# Patient Record
Sex: FEMALE | NOT HISPANIC OR LATINO | Employment: PART TIME | URBAN - METROPOLITAN AREA
[De-identification: names, ages, dates, MRNs, and addresses within clinical notes are randomized per-mention and may not be internally consistent; named-entity substitution may affect disease eponyms.]

---

## 2017-07-12 ENCOUNTER — TRANSFERRED RECORDS (OUTPATIENT)
Dept: HEALTH INFORMATION MANAGEMENT | Facility: CLINIC | Age: 22
End: 2017-07-12

## 2018-02-12 ENCOUNTER — OFFICE VISIT (OUTPATIENT)
Dept: FAMILY MEDICINE | Facility: CLINIC | Age: 23
End: 2018-02-12
Payer: COMMERCIAL

## 2018-02-12 VITALS
TEMPERATURE: 98.5 F | BODY MASS INDEX: 18.48 KG/M2 | HEART RATE: 80 BPM | SYSTOLIC BLOOD PRESSURE: 98 MMHG | HEIGHT: 66 IN | DIASTOLIC BLOOD PRESSURE: 66 MMHG | WEIGHT: 115 LBS

## 2018-02-12 DIAGNOSIS — K21.9 GASTROESOPHAGEAL REFLUX DISEASE WITHOUT ESOPHAGITIS: ICD-10-CM

## 2018-02-12 DIAGNOSIS — Z00.00 ROUTINE GENERAL MEDICAL EXAMINATION AT A HEALTH CARE FACILITY: Primary | ICD-10-CM

## 2018-02-12 DIAGNOSIS — F41.9 ANXIETY: ICD-10-CM

## 2018-02-12 DIAGNOSIS — Z23 NEED FOR HPV VACCINE: ICD-10-CM

## 2018-02-12 DIAGNOSIS — Z11.3 SCREENING EXAMINATION FOR VENEREAL DISEASE: ICD-10-CM

## 2018-02-12 PROCEDURE — 90471 IMMUNIZATION ADMIN: CPT | Performed by: PHYSICIAN ASSISTANT

## 2018-02-12 PROCEDURE — 90651 9VHPV VACCINE 2/3 DOSE IM: CPT | Performed by: PHYSICIAN ASSISTANT

## 2018-02-12 PROCEDURE — 87591 N.GONORRHOEAE DNA AMP PROB: CPT | Performed by: PHYSICIAN ASSISTANT

## 2018-02-12 PROCEDURE — 87491 CHLMYD TRACH DNA AMP PROBE: CPT | Performed by: PHYSICIAN ASSISTANT

## 2018-02-12 PROCEDURE — G0145 SCR C/V CYTO,THINLAYER,RESCR: HCPCS | Performed by: PHYSICIAN ASSISTANT

## 2018-02-12 PROCEDURE — 99385 PREV VISIT NEW AGE 18-39: CPT | Mod: 25 | Performed by: PHYSICIAN ASSISTANT

## 2018-02-12 RX ORDER — SERTRALINE HYDROCHLORIDE 100 MG/1
100 TABLET, FILM COATED ORAL DAILY
Qty: 90 TABLET | Refills: 1 | Status: SHIPPED | OUTPATIENT
Start: 2018-02-12 | End: 2018-08-29

## 2018-02-12 RX ORDER — SERTRALINE HYDROCHLORIDE 100 MG/1
100 TABLET, FILM COATED ORAL DAILY
COMMUNITY
End: 2018-02-12

## 2018-02-12 ASSESSMENT — ENCOUNTER SYMPTOMS
NEUROLOGICAL NEGATIVE: 1
ROS SKIN COMMENTS: ALOPECIA
RESPIRATORY NEGATIVE: 1
ARTHRALGIAS: 1
NERVOUS/ANXIOUS: 1
CONSTITUTIONAL NEGATIVE: 1
ABDOMINAL PAIN: 1
HEARTBURN: 1
EYES NEGATIVE: 1
CARDIOVASCULAR NEGATIVE: 1

## 2018-02-12 ASSESSMENT — ANXIETY QUESTIONNAIRES
3. WORRYING TOO MUCH ABOUT DIFFERENT THINGS: SEVERAL DAYS
1. FEELING NERVOUS, ANXIOUS, OR ON EDGE: MORE THAN HALF THE DAYS
IF YOU CHECKED OFF ANY PROBLEMS ON THIS QUESTIONNAIRE, HOW DIFFICULT HAVE THESE PROBLEMS MADE IT FOR YOU TO DO YOUR WORK, TAKE CARE OF THINGS AT HOME, OR GET ALONG WITH OTHER PEOPLE: SOMEWHAT DIFFICULT
7. FEELING AFRAID AS IF SOMETHING AWFUL MIGHT HAPPEN: MORE THAN HALF THE DAYS
2. NOT BEING ABLE TO STOP OR CONTROL WORRYING: MORE THAN HALF THE DAYS
5. BEING SO RESTLESS THAT IT IS HARD TO SIT STILL: NOT AT ALL
GAD7 TOTAL SCORE: 7
6. BECOMING EASILY ANNOYED OR IRRITABLE: NOT AT ALL

## 2018-02-12 ASSESSMENT — PATIENT HEALTH QUESTIONNAIRE - PHQ9: 5. POOR APPETITE OR OVEREATING: NOT AT ALL

## 2018-02-12 NOTE — PROGRESS NOTES
SUBJECTIVE:   CC: Shirley Matthew is an 22 year old woman who presents for preventive health visit.     Physical   Annual:     Getting at least 3 servings of Calcium per day::  NO    Bi-annual eye exam::  NO    Dental care twice a year::  NO    Sleep apnea or symptoms of sleep apnea::  None    Diet::  Regular (no restrictions)    Frequency of exercise::  1 day/week    Duration of exercise::  Less than 15 minutes    Taking medications regularly::  Yes    Medication side effects::  None    Additional concerns today::  YES            ACID reflux discussion wakes up with throat burning and ears burning.  Feels gas in throat.  Happening more lately.  Gaviscon helps if she takes before acid reflux.  Started a few months ago.  No vomiting or weight loss.  Can have lingering throat pain.  No more stress.      Today's PHQ-2 Score:   PHQ-2 ( 1999 Pfizer) 2/12/2018   Q1: Little interest or pleasure in doing things 0   Q2: Feeling down, depressed or hopeless 0   PHQ-2 Score 0   Q1: Little interest or pleasure in doing things Not at all   Q2: Feeling down, depressed or hopeless Not at all   PHQ-2 Score 0       Abuse: Current or Past(Physical, Sexual or Emotional)- no   Do you feel safe in your environment - Yes    Social History   Substance Use Topics     Smoking status: Never Smoker     Smokeless tobacco: Never Used     Alcohol use No     Alcohol Use 2/12/2018   If you drink alcohol, do you typically have greater than 3 drinks per day OR greater than 7 drinks per week?   Not applicable       Reviewed orders with patient.  Reviewed health maintenance and updated orders accordingly - Yes      Mammogram not appropriate for this patient based on age.    Pertinent mammograms are reviewed under the imaging tab.  History of abnormal Pap smear: NO - age 21-29 PAP every 3 years recommended    Reviewed and updated as needed this visit by clinical staff  Allergies  Meds  Problems  Med Hx  Fam Hx         Reviewed and updated as  "needed this visit by Provider  Allergies  Meds  Problems  Med Hx  Fam Hx            Review of Systems   Constitutional: Negative.    HENT: Negative.    Eyes: Negative.    Respiratory: Negative.    Cardiovascular: Negative.    Gastrointestinal: Positive for abdominal pain and heartburn.   Genitourinary: Negative.    Musculoskeletal: Positive for arthralgias (leg pain at night.  doesn't feel like she has to move legs, since childhood ).   Skin: Negative.         alopecia    Neurological: Negative.    Psychiatric/Behavioral: The patient is nervous/anxious (is being treated ).           OBJECTIVE:   BP 98/66 (BP Location: Right arm, Patient Position: Sitting, Cuff Size: Adult Regular)  Pulse 80  Temp 98.5  F (36.9  C) (Oral)  Ht 5' 5.5\" (1.664 m)  Wt 115 lb (52.2 kg)  LMP 02/05/2018  BMI 18.85 kg/m2  Physical Exam   Constitutional: She is oriented to person, place, and time. She appears well-developed and well-nourished. No distress.   HENT:   Right Ear: External ear normal.   Left Ear: External ear normal.   Nose: Nose normal.   Mouth/Throat: Oropharynx is clear and moist. No oropharyngeal exudate.   Eyes: Conjunctivae are normal. Pupils are equal, round, and reactive to light. Right eye exhibits no discharge. Left eye exhibits no discharge.   Neck: Neck supple. No tracheal deviation present. No thyromegaly present.   Cardiovascular: Normal rate, regular rhythm, S1 normal, S2 normal, normal heart sounds and normal pulses.  Exam reveals no S3, no S4 and no friction rub.    No murmur heard.  Pulmonary/Chest: Effort normal and breath sounds normal. No respiratory distress. She has no wheezes. She has no rales.   Abdominal: Soft. Bowel sounds are normal. She exhibits no mass. There is no hepatosplenomegaly. There is tenderness (epigastric ).   Genitourinary: Vagina normal and uterus normal. No breast swelling, tenderness or discharge. Cervix exhibits no motion tenderness and no discharge. No vaginal discharge " "found.   Musculoskeletal: Normal range of motion. She exhibits no edema.   Lymphadenopathy:     She has no cervical adenopathy.   Neurological: She is alert and oriented to person, place, and time. She has normal strength and normal reflexes. She exhibits normal muscle tone.   Skin: Skin is warm and dry. No rash noted.   Psychiatric: She has a normal mood and affect. Judgment and thought content normal. Cognition and memory are normal.         ASSESSMENT/PLAN:   1. Routine general medical examination at a health care facility    - Pap imaged thin layer screen only - recommended age 21 - 24 years    2. Gastroesophageal reflux disease without esophagitis  Try zantac, follow up if not improving   - ranitidine (ZANTAC) 150 MG tablet; Take 1 tablet (150 mg) by mouth At Bedtime  Dispense: 60 tablet; Refill: 1    3. Anxiety  Continue.  Follow up every 6 months or as needed  - sertraline (ZOLOFT) 100 MG tablet; Take 1 tablet (100 mg) by mouth daily  Dispense: 90 tablet; Refill: 1    4. Screening examination for venereal disease    - NEISSERIA GONORRHOEA PCR  - CHLAMYDIA TRACHOMATIS PCR    5. Need for HPV vaccine    - HC HPV VAC 9V 3 DOSE IM    COUNSELING:  Reviewed preventive health counseling, as reflected in patient instructions       Regular exercise       Healthy diet/nutrition       Contraception       Folic Acid Counseling         reports that she has never smoked. She has never used smokeless tobacco.    Estimated body mass index is 18.85 kg/(m^2) as calculated from the following:    Height as of this encounter: 5' 5.5\" (1.664 m).    Weight as of this encounter: 115 lb (52.2 kg).       Counseling Resources:  ATP IV Guidelines  Pooled Cohorts Equation Calculator  Breast Cancer Risk Calculator  FRAX Risk Assessment  ICSI Preventive Guidelines  Dietary Guidelines for Americans, 2010  [employer]'s MyPlate  ASA Prophylaxis  Lung CA Screening    Jumana Mcbride PA-C  [facility]  Answers for HPI/ROS " submitted by the patient on 2/12/2018   PHQ-2 Score: 0

## 2018-02-12 NOTE — MR AVS SNAPSHOT
"              After Visit Summary   2/12/2018    Shirley Matthew    MRN: [id number]           Patient Information     Date Of Birth          1995        Visit Information        Provider Department      2/12/2018 2:00 PM Jumana Mcbride PA-C [facility]        Today's Diagnoses     Routine general medical examination at a health care facility    -  1    Screening examination for venereal disease        Need for HPV vaccine        Anxiety        Gastroesophageal reflux disease without esophagitis          Care Instructions    Start a prenatal vitamin           Follow-ups after your visit        Follow-up notes from your care team     Return in about 6 months (around 8/12/2018) for mood.      Who to contact     If you have questions or need follow up information about today's clinic visit or your schedule please contact [facility] directly at [phone].  Normal or non-critical lab and imaging results will be communicated to you by MyChart, letter or phone within 4 business days after the clinic has received the results. If you do not hear from us within 7 days, please contact the clinic through MyChart or phone. If you have a critical or abnormal lab result, we will notify you by phone as soon as possible.  Submit refill requests through Labfolder or call your pharmacy and they will forward the refill request to us. Please allow 3 business days for your refill to be completed.          Additional Information About Your Visit        MyChart Information     Labfolder lets you send messages to your doctor, view your test results, renew your prescriptions, schedule appointments and more. To sign up, go to [email or web address] . Click on \"Log in\" on the left side of the screen, which will take you to the Welcome page. Then click on \"Sign up Now\" on the right side of the page.     You will be asked to enter the access code listed below, as well as some " "personal information. Please follow the directions to create your username and password.     Your access code is: [email or web address]  Expires: 2018  3:21 PM     Your access code will  in 90 days. If you need help or a new code, please call your [facility] clinic or [phone].        Care EveryWhere ID     This is your Care EveryWhere ID. This could be used by other organizations to access your [facility] medical records  [id number]        Your Vitals Were     Pulse Temperature Height Last Period BMI (Body Mass Index)       80 98.5  F (36.9  C) (Oral) 5' 5.5\" (1.664 m) 2018 18.85 kg/m2        Blood Pressure from Last 3 Encounters:   18 98/    Weight from Last 3 Encounters:   18 115 lb (52.2 kg)              We Performed the Following     CHLAMYDIA TRACHOMATIS PCR     HC HPV VAC 9V 3 DOSE IM     NEISSERIA GONORRHOEA PCR     Pap imaged thin layer screen only - recommended age 21 - 24 years          Today's Medication Changes          These changes are accurate as of 18  3:21 PM.  If you have any questions, ask your nurse or doctor.               Start taking these medicines.        Dose/Directions    ranitidine 150 MG tablet   Commonly known as:  ZANTAC   Used for:  Gastroesophageal reflux disease without esophagitis   Started by:  Jumana Mcbride PA-C        Dose:  150 mg   Take 1 tablet (150 mg) by mouth At Bedtime   Quantity:  60 tablet   Refills:  1            Where to get your medicines      These medications were sent to [place] [address] [place] [facility] [place] [address]  [facility] [address] [place] [address]     Phone:  [phone]     ranitidine 150 MG tablet    sertraline 100 MG tablet                Primary Care Provider Office Phone # Fax #    [facility] [phone]       [address] [place] [address]        Equal Access to Services     JUANI REYNODLS AH: Hadii quita trevizo Soelkin, waaxda luqadakaye, qaybta kaalmada " daryl valenciamadelineaustin la'aajaswinder ah. [facility] [phone].    ATENCIÓN: Si habla español, tiene a tomas disposición servicios gratuitos de asistencia lingüística. Tuan al [phone].    We comply with applicable federal civil rights laws and [place] laws. We do not discriminate on the basis of race, color, national origin, age, disability, sex, sexual orientation, or gender identity.            Thank you!     Thank you for choosing [facility]  for your care. Our goal is always to provide you with excellent care. Hearing back from our patients is one way we can continue to improve our services. Please take a few minutes to complete the written survey that you may receive in the mail after your visit with us. Thank you!             Your Updated Medication List - Protect others around you: Learn how to safely use, store and throw away your medicines at www.disposemymeds.org.          This list is accurate as of 2/12/18  3:21 PM.  Always use your most recent med list.                   Brand Name Dispense Instructions for use Diagnosis    GAVISCON EXTRA STRENGTH PO           ranitidine 150 MG tablet    ZANTAC    60 tablet    Take 1 tablet (150 mg) by mouth At Bedtime    Gastroesophageal reflux disease without esophagitis       sertraline 100 MG tablet    ZOLOFT    90 tablet    Take 1 tablet (100 mg) by mouth daily    Anxiety

## 2018-02-12 NOTE — NURSING NOTE
"Chief Complaint   Patient presents with     Physical       Initial BP 98/66 (BP Location: Right arm, Patient Position: Sitting, Cuff Size: Adult Regular)  Pulse 80  Temp 98.5  F (36.9  C) (Oral)  Ht 5' 5.5\" (1.664 m)  Wt 115 lb (52.2 kg)  LMP 02/05/2018  BMI 18.85 kg/m2 Estimated body mass index is 18.85 kg/(m^2) as calculated from the following:    Height as of this encounter: 5' 5.5\" (1.664 m).    Weight as of this encounter: 115 lb (52.2 kg).  Medication Reconciliation: complete  Sunni Roth MA    "

## 2018-02-12 NOTE — NURSING NOTE
Patient is covered under this program for the following reason:        Screening Questionnaire for Adult Immunization   Are you sick today?   No    Do you have allergies to medications, food, a vaccine component or latex?   No    Have you ever had a serious reaction after receiving a vaccination?   No    Do you have a long-term health problem with heart disease, lung disease,  asthma, kidney disease, metabolic (e.g., diabetes), anemia, or other blood disorder?   No    Do you have cancer, leukemia,HIV/ AIDS, or any immune system problem?   No       In the past 3 months, have you taken medications that weaken your immune system, such as cortisone, prednisone, other steroids, or anticancer drugs, or have you had radiation treatments?   No    Have you had a seizure or a brain, or other nervous system problem?   No    During the past year, have you received a transfusion of blood or blood       products, or been given a  immune (gamma) globulin or an antiviral drug?   No    For women: Are you pregnant or is there a chance you could become         pregnant during the next month?   No    Have you received any vaccinations in the past 4 weeks?   No     Immunization questionnaire answers were all negative.     VIS for HPV  given on same date of administration.  Staff signature/Title: Sunni Roth MA  Due to injection administration, patient instructed to remain in clinic for 15 minutes  afterwards, and to report any adverse reaction to me immediately.

## 2018-02-12 NOTE — LETTER
February 15, 2018      Shirley Stearns Vipul  [address] [place] [address]    Dear ,      I am happy to inform you that your recent cervical cancer screening test (PAP smear) was normal.      Preventative screenings such as this help to ensure your health for years to come. You should repeat a pap smear in 3 years, unless otherwise directed.      You will still need to return to the clinic every year for your annual exam and other preventive tests.     Please contact the clinic at [phone] if you have further questions.       Sincerely,      Jumana Mcbride PA-C/cori

## 2018-02-12 NOTE — LETTER
[facility]  [address] [place]  [address]  [phone]        February 13, 2018    Shirley Matthew  [address]  [facility] [address]        Dear Shirley,    Your sexually transmitted disease testing is negative.     Results for orders placed or performed in visit on 02/12/18   NEISSERIA GONORRHOEA PCR   Result Value Ref Range    Specimen Descrip Cervix     N Gonorrhea PCR Negative NEG^Negative   CHLAMYDIA TRACHOMATIS PCR   Result Value Ref Range    Specimen Description Cervix     Chlamydia Trachomatis PCR Negative NEG^Negative       If you have any questions please call the clinic at [phone].    Sincerely,    Jumana MCDONALDL

## 2018-02-13 LAB
C TRACH DNA SPEC QL NAA+PROBE: NEGATIVE
N GONORRHOEA DNA SPEC QL NAA+PROBE: NEGATIVE
SPECIMEN SOURCE: NORMAL
SPECIMEN SOURCE: NORMAL

## 2018-02-13 ASSESSMENT — ANXIETY QUESTIONNAIRES: GAD7 TOTAL SCORE: 7

## 2018-02-13 ASSESSMENT — PATIENT HEALTH QUESTIONNAIRE - PHQ9: SUM OF ALL RESPONSES TO PHQ QUESTIONS 1-9: 1

## 2018-02-14 LAB
COPATH REPORT: NORMAL
PAP: NORMAL

## 2018-06-17 DIAGNOSIS — K21.9 GASTROESOPHAGEAL REFLUX DISEASE WITHOUT ESOPHAGITIS: ICD-10-CM

## 2018-06-18 NOTE — TELEPHONE ENCOUNTER
"Requested Prescriptions   Pending Prescriptions Disp Refills     ranitidine (ZANTAC) 150 MG tablet [Pharmacy Med Name: RANITIDINE 150 MG TABLET] 60 tablet 1        Last Written Prescription Date:  2/12/18  Last Fill Quantity: 60,  # refills: 1   Last office visit: 2/12/2018 with prescribing provider:  2/12/18   Future Office Visit:     Sig: TAKE 1 TABLET BY MOUTH NIGHTLY AT BEDTIME    H2 Blockers Protocol Passed    6/17/2018  1:28 AM       Passed - Patient is age 12 or older       Passed - Recent (12 mo) or future (30 days) visit within the authorizing provider's specialty    Patient had office visit in the last 12 months or has a visit in the next 30 days with authorizing provider or within the authorizing provider's specialty.  See \"Patient Info\" tab in inbasket, or \"Choose Columns\" in Meds & Orders section of the refill encounter.            See plan at 2/12/18 visit:            Instructions        Return in about 6 months (around 8/12/2018) for mood.       Prescription approved per [facility] Refill Protocol.    Reina Angulo RN  [facility]      "

## 2018-11-19 DIAGNOSIS — F41.9 ANXIETY: ICD-10-CM

## 2018-11-19 NOTE — TELEPHONE ENCOUNTER
Requested Prescriptions   Pending Prescriptions Disp Refills     sertraline (ZOLOFT) 100 MG tablet 30 tablet 0     Sig: Take 1 tablet (100 mg) by mouth daily    There is no refill protocol information for this order   Last Written Prescription Date:  8-30-18  Last Fill Quantity: 30,  # refills: 0   Last office visit: 2/12/2018 with prescribing provider:  2-12-18   Future Office Visit:

## 2018-11-20 NOTE — TELEPHONE ENCOUNTER
TC:  Please outreach to patient. She was asked to follow up in 6 months and was due to be seen in August.  Thank you.  Dnia Iraheta RN

## 2018-11-23 NOTE — TELEPHONE ENCOUNTER
"Requested Prescriptions   Pending Prescriptions Disp Refills     sertraline (ZOLOFT) 100 MG tablet 30 tablet 0     Sig: Take 1 tablet (100 mg) by mouth daily    SSRIs Protocol Passed    11/19/2018  3:05 PM       Passed - Recent (12 mo) or future (30 days) visit within the authorizing provider's specialty    Patient had office visit in the last 12 months or has a visit in the next 30 days with authorizing provider or within the authorizing provider's specialty.  See \"Patient Info\" tab in inbasket, or \"Choose Columns\" in Meds & Orders section of the refill encounter.             Passed - Patient is age 18 or older       Passed - No active pregnancy on record       Passed - No positive pregnancy test in last 12 months        PHQ-9 score:    PHQ-9 SCORE 2/12/2018   Total Score 1       Attempted to call patient at home number, left message with adult female who answered; she took message: patient was instructed to return call to [facility] RN directly on the RN call back line at [phone]   Reina Angulo RN  [facility] [place] [facility]          "

## 2018-11-26 RX ORDER — SERTRALINE HYDROCHLORIDE 100 MG/1
100 TABLET, FILM COATED ORAL DAILY
Qty: 15 TABLET | Refills: 0 | Status: SHIPPED | OUTPATIENT
Start: 2018-11-26 | End: 2018-11-28

## 2018-11-26 NOTE — TELEPHONE ENCOUNTER
Patient is scheduled for 11/28/18.    Last Rx was only 30 days on 8/30/18.    I called [facility] who confirms patient last filled 30 days on 9/15/18.    Would have been off a while now.      Routing refill request to provider for review/approval because:  Provider to advise on refill or just wait until patient seen in 2 days.    Reina Angulo RN  [facility]

## 2018-11-28 ENCOUNTER — OFFICE VISIT (OUTPATIENT)
Dept: FAMILY MEDICINE | Facility: CLINIC | Age: 23
End: 2018-11-28
Payer: COMMERCIAL

## 2018-11-28 VITALS
HEART RATE: 80 BPM | TEMPERATURE: 98.3 F | BODY MASS INDEX: 19.67 KG/M2 | DIASTOLIC BLOOD PRESSURE: 60 MMHG | WEIGHT: 120 LBS | SYSTOLIC BLOOD PRESSURE: 103 MMHG

## 2018-11-28 DIAGNOSIS — Z23 NEED FOR HPV VACCINE: ICD-10-CM

## 2018-11-28 DIAGNOSIS — F41.9 ANXIETY: Primary | ICD-10-CM

## 2018-11-28 DIAGNOSIS — Z23 NEED FOR PROPHYLACTIC VACCINATION WITH TETANUS-DIPHTHERIA (TD): ICD-10-CM

## 2018-11-28 DIAGNOSIS — M25.531 RIGHT WRIST PAIN: ICD-10-CM

## 2018-11-28 PROCEDURE — 99213 OFFICE O/P EST LOW 20 MIN: CPT | Mod: 25 | Performed by: PHYSICIAN ASSISTANT

## 2018-11-28 PROCEDURE — 90649 4VHPV VACCINE 3 DOSE IM: CPT | Performed by: PHYSICIAN ASSISTANT

## 2018-11-28 PROCEDURE — 90471 IMMUNIZATION ADMIN: CPT | Performed by: PHYSICIAN ASSISTANT

## 2018-11-28 PROCEDURE — 90472 IMMUNIZATION ADMIN EACH ADD: CPT | Performed by: PHYSICIAN ASSISTANT

## 2018-11-28 PROCEDURE — 90714 TD VACC NO PRESV 7 YRS+ IM: CPT | Performed by: PHYSICIAN ASSISTANT

## 2018-11-28 RX ORDER — SERTRALINE HYDROCHLORIDE 100 MG/1
100 TABLET, FILM COATED ORAL DAILY
Qty: 90 TABLET | Refills: 1 | Status: SHIPPED | OUTPATIENT
Start: 2018-11-28 | End: 2019-05-31

## 2018-11-28 ASSESSMENT — PATIENT HEALTH QUESTIONNAIRE - PHQ9: 5. POOR APPETITE OR OVEREATING: NOT AT ALL

## 2018-11-28 ASSESSMENT — ANXIETY QUESTIONNAIRES
GAD7 TOTAL SCORE: 2
1. FEELING NERVOUS, ANXIOUS, OR ON EDGE: SEVERAL DAYS
2. NOT BEING ABLE TO STOP OR CONTROL WORRYING: NOT AT ALL
6. BECOMING EASILY ANNOYED OR IRRITABLE: SEVERAL DAYS
3. WORRYING TOO MUCH ABOUT DIFFERENT THINGS: NOT AT ALL
7. FEELING AFRAID AS IF SOMETHING AWFUL MIGHT HAPPEN: NOT AT ALL
5. BEING SO RESTLESS THAT IT IS HARD TO SIT STILL: NOT AT ALL
IF YOU CHECKED OFF ANY PROBLEMS ON THIS QUESTIONNAIRE, HOW DIFFICULT HAVE THESE PROBLEMS MADE IT FOR YOU TO DO YOUR WORK, TAKE CARE OF THINGS AT HOME, OR GET ALONG WITH OTHER PEOPLE: NOT DIFFICULT AT ALL

## 2018-11-28 NOTE — PROGRESS NOTES
SUBJECTIVE:   Shirley Matthew is a 22 year old female who presents to clinic today for the following health issues:        Anxiety Follow-Up    Status since last visit: Improved     Other associated symptoms:None    Complicating factors:   Significant life event: No   Current substance abuse: None  Depression symptoms: No  JUAN DIEGO-7 SCORE 2/12/2018   Total Score 7       JUAN DIEGO-7    Amount of exercise or physical activity: None    Problems taking medications regularly: No    Medication side effects: none    Diet: regular (no restrictions)    Not seeing a therapist.  Wants to stay on them.      Every few months right wrist hurts.  Is a teller and right handed.  Comes and goes.  No injury.  No tingling in hands.  Pain is over the top of her wrist        Problem list and histories reviewed & adjusted, as indicated.  Additional history: as documented    Patient Active Problem List   Diagnosis     Anxiety     Alopecia     Gastroesophageal reflux disease without esophagitis     History reviewed. No pertinent surgical history.    Social History   Substance Use Topics     Smoking status: Never Smoker     Smokeless tobacco: Never Used     Alcohol use No     Family History   Problem Relation Age of Onset     Colon Cancer Father 52     Alcoholism Father      Anxiety Disorder Sister      Anxiety Disorder Brother      Alcoholism Brother      Thyroid Disease Maternal Grandmother      Thyroid Disease Paternal Grandmother      Anxiety Disorder Sister      Alcoholism Mother      Suicide Cousin      Schizophrenia Cousin            Reviewed and updated as needed this visit by clinical staff  Tobacco  Allergies  Meds  Med Hx  Surg Hx  Fam Hx  Soc Hx      Reviewed and updated as needed this visit by Provider         ROS:  As above    OBJECTIVE:     /60  Pulse 80  Temp 98.3  F (36.8  C) (Oral)  Wt 120 lb (54.4 kg)  LMP 11/07/2018  BMI 19.67 kg/m2  Body mass index is 19.67 kg/(m^2).  GENERAL: healthy, alert and no  distress  RESP: lungs clear to auscultation - no rales, rhonchi or wheezes  CV: regular rates and rhythm, normal S1 S2, no S3 or S4 and no murmur, click or rub  MS: normal rom of both wrists, non tender to palpation both writs.    PSYCH: mentation appears normal, affect normal/bright    Diagnostic Test Results:  none     ASSESSMENT/PLAN:       1. Anxiety  Stable.  Really well controlled.  No changes  - sertraline (ZOLOFT) 100 MG tablet; Take 1 tablet (100 mg) by mouth daily  Dispense: 90 tablet; Refill: 1    2. Right wrist pain  Overuse injury.  recommended wrist brace as needed.      3. Need for HPV vaccine    - HUMAN PAPILLOMAVIRUS VACCINE  - VACCINE ADMINISTRATION, INITIAL  - VACCINE ADMINISTRATION, EACH ADDITIONAL    4. Need for prophylactic vaccination with tetanus-diphtheria (Td)    - TD (ADULT, 7+) PRESERVE FREE    FUTURE APPOINTMENTS:       - Follow-up visit in 6 months    Jumana Mcbride PA-C  [facility]

## 2018-11-28 NOTE — NURSING NOTE
Screening Questionnaire for Adult Immunization    Are you sick today?   No   Do you have allergies to medications, food, a vaccine component or latex?   No   Have you ever had a serious reaction after receiving a vaccination?   No   Do you have a long-term health problem with heart disease, lung disease, asthma, kidney disease, metabolic disease (e.g. diabetes), anemia, or other blood disorder?   No   Do you have cancer, leukemia, HIV/AIDS, or any other immune system problem?   No   In the past 3 months, have you taken medications that affect  your immune system, such as prednisone, other steroids, or anticancer drugs; drugs for the treatment of rheumatoid arthritis, Crohn s disease, or psoriasis; or have you had radiation treatments?   No   Have you had a seizure, or a brain or other nervous system problem?   No   During the past year, have you received a transfusion of blood or blood     products, or been given immune (gamma) globulin or antiviral drug?   No   For women: Are you pregnant or is there a chance you could become        pregnant during the next month?   No   Have you received any vaccinations in the past 4 weeks?   No     Immunization questionnaire answers were all negative.        Per orders of Dr. Mcbride, injection of HPV Td given by Sunni Roth. Patient instructed to remain in clinic for 15 minutes afterwards, and to report any adverse reaction to me immediately.       Screening performed by Sunni Roth on 11/28/2018 at 1:28 PM.

## 2018-11-28 NOTE — PATIENT INSTRUCTIONS
Billing/ Insurance    There will be a charge that will be billed to your insurance company. If your insurance does not cover it, it will be billed to you. It is charged based on the time spent on the telephone with you in increments of 10 minutes of medical discussion. You may wish to check with your insurance company to see if they cover telephone visits.    Cost of Phone Visits/ E-Visits    5 to 10 minutes is $30.00  11 to 20 minutes is $59.00  21 to 30 minutes is $85.00     E-Visit is $35.00    Before Visit    Before your visit with the provider, a staff person will call you to obtain a second verbal consent, verify your insurance company, home address, and phone number. We will also need to update your medication list and allergies.    In the end, if you wish to see the provider in the office instead, we can help you make your in person appointment. Through Robin, you can also send and E-Visit Request and the provider will respond to you within 24 hours.

## 2018-11-28 NOTE — MR AVS SNAPSHOT
After Visit Summary   11/28/2018    Shirley Matthew    MRN: [id number]           Patient Information     Date Of Birth          1995        Visit Information        Provider Department      11/28/2018 12:40 PM Jumana Mcbride PA-C [facility]        Today's Diagnoses     Anxiety    -  1    Need for HPV vaccine        Need for prophylactic vaccination with tetanus-diphtheria (Td)        Right wrist pain          Care Instructions    Billing/ Insurance    There will be a charge that will be billed to your insurance company. If your insurance does not cover it, it will be billed to you. It is charged based on the time spent on the telephone with you in increments of 10 minutes of medical discussion. You may wish to check with your insurance company to see if they cover telephone visits.    Cost of Phone Visits/ E-Visits    5 to 10 minutes is $30.00  11 to 20 minutes is $59.00  21 to 30 minutes is $85.00     E-Visit is $35.00    Before Visit    Before your visit with the provider, a staff person will call you to obtain a second verbal consent, verify your insurance company, home address, and phone number. We will also need to update your medication list and allergies.    In the end, if you wish to see the provider in the office instead, we can help you make your in person appointment. Through [employer], you can also send and E-Visit Request and the provider will respond to you within 24 hours.            Follow-ups after your visit        Follow-up notes from your care team     Return in about 6 months (around 5/28/2019) for mood.      Who to contact     If you have questions or need follow up information about today's clinic visit or your schedule please contact [facility] directly at [phone].  Normal or non-critical lab and imaging results will be communicated to you by MyChart, letter or phone within 4 business days after the clinic has  "received the results. If you do not hear from us within 7 days, please contact the clinic through [employer] or phone. If you have a critical or abnormal lab result, we will notify you by phone as soon as possible.  Submit refill requests through [employer] or call your pharmacy and they will forward the refill request to us. Please allow 3 business days for your refill to be completed.          Additional Information About Your Visit        [employer] Information     [employer] lets you send messages to your doctor, view your test results, renew your prescriptions, schedule appointments and more. To sign up, go to [email or web address] . Click on \"Log in\" on the left side of the screen, which will take you to the Welcome page. Then click on \"Sign up Now\" on the right side of the page.     You will be asked to enter the access code listed below, as well as some personal information. Please follow the directions to create your username and password.     Your access code is: [email or web address]  Expires: 2019  1:17 PM     Your access code will  in 90 days. If you need help or a new code, please call your [place] clinic or [phone].        Care EveryWhere ID     This is your Care EveryWhere ID. This could be used by other organizations to access your [place] medical records  [id number]        Your Vitals Were     Pulse Temperature Last Period BMI (Body Mass Index)          80 98.3  F (36.8  C) (Oral) 2018 19.67 kg/m2         Blood Pressure from Last 3 Encounters:   18 103/60   18 98/66    Weight from Last 3 Encounters:   18 120 lb (54.4 kg)   18 115 lb (52.2 kg)              We Performed the Following     HUMAN PAPILLOMAVIRUS VACCINE     TD (ADULT, 7+) PRESERVE FREE     VACCINE ADMINISTRATION, EACH ADDITIONAL     VACCINE ADMINISTRATION, INITIAL          Today's Medication Changes          These changes are accurate as of 18  1:17 PM.  If you have any questions, ask your nurse or " doctor.               These medicines have changed or have updated prescriptions.        Dose/Directions    sertraline 100 MG tablet   Commonly known as:  ZOLOFT   This may have changed:  additional instructions   Used for:  Anxiety   Changed by:  Jumana Mcbride PA-C        Dose:  100 mg   Take 1 tablet (100 mg) by mouth daily   Quantity:  90 tablet   Refills:  1            Where to get your medicines      These medications were sent to [place] [address] [facility] - [facility] [address]  [address], [facility] [address]     Phone:  [phone]     sertraline 100 MG tablet                Primary Care Provider Office Phone # Fax #    Jumana Mcbride PA-C [phone]       [address]  [facility] [address]        Equal Access to Services     JUANI REYNOLDS : Hadii quita bowden hadasho Soomaali, waaxda luqadaha, qaybta kaalmada adeegyada, daryl chamorro . So [facility] [phone].    ATENCIÓN: Si habla español, tiene a tomas disposición servicios gratuitos de asistencia lingüística. Llame al [phone].    We comply with applicable federal civil rights laws and [place] laws. We do not discriminate on the basis of race, color, national origin, age, disability, sex, sexual orientation, or gender identity.            Thank you!     Thank you for choosing [facility]  for your care. Our goal is always to provide you with excellent care. Hearing back from our patients is one way we can continue to improve our services. Please take a few minutes to complete the written survey that you may receive in the mail after your visit with us. Thank you!             Your Updated Medication List - Protect others around you: Learn how to safely use, store and throw away your medicines at www.disposemymeds.org.          This list is accurate as of 11/28/18  1:17 PM.  Always use your most recent med list.                   Brand Name Dispense Instructions  for use Diagnosis    ranitidine 150 MG tablet    ZANTAC    60 tablet    TAKE 1 TABLET BY MOUTH NIGHTLY AT BEDTIME    Gastroesophageal reflux disease without esophagitis       sertraline 100 MG tablet    ZOLOFT    90 tablet    Take 1 tablet (100 mg) by mouth daily    Anxiety

## 2018-11-29 ASSESSMENT — ANXIETY QUESTIONNAIRES: GAD7 TOTAL SCORE: 2

## 2019-05-31 DIAGNOSIS — F41.9 ANXIETY: ICD-10-CM

## 2019-05-31 NOTE — TELEPHONE ENCOUNTER
"Requested Prescriptions   Pending Prescriptions Disp Refills     sertraline (ZOLOFT) 100 MG tablet [Pharmacy Med Name: SERTRALINE  MG TABLET]  Last Written Prescription Date:  11/28/18  Last Fill Quantity: 90,  # refills: 1   Last office visit: 11/28/2018 with prescribing provider:  Reed   Future Office Visit:     90 tablet 1     Sig: TAKE 1 TABLET BY MOUTH EVERY DAY       SSRIs Protocol Passed - 5/31/2019  1:06 AM        Passed - Recent (12 mo) or future (30 days) visit within the authorizing provider's specialty     Patient had office visit in the last 12 months or has a visit in the next 30 days with authorizing provider or within the authorizing provider's specialty.  See \"Patient Info\" tab in inbasket, or \"Choose Columns\" in Meds & Orders section of the refill encounter.              Passed - Medication is active on med list        Passed - Patient is age 18 or older        Passed - No active pregnancy on record        Passed - No positive pregnancy test in last 12 months          "

## 2019-06-04 RX ORDER — SERTRALINE HYDROCHLORIDE 100 MG/1
TABLET, FILM COATED ORAL
Qty: 90 TABLET | Refills: 1 | Status: SHIPPED | OUTPATIENT
Start: 2019-06-04 | End: 2020-01-16

## 2019-06-04 NOTE — TELEPHONE ENCOUNTER
Prescription approved per [facility] Refill Protocol.      Madisyn Mcleod RN  [facility]

## 2020-01-16 DIAGNOSIS — F41.9 ANXIETY: ICD-10-CM

## 2020-01-16 NOTE — TELEPHONE ENCOUNTER
"Requested Prescriptions   Pending Prescriptions Disp Refills     sertraline (ZOLOFT) 100 MG tablet 90 tablet 1     Sig: Take 1 tablet (100 mg) by mouth daily   Last Written Prescription Date:  6/4/19  Last Fill Quantity: 90,  # refills: 1   Last office visit: 11/28/2018 with prescribing provider:     Future Office Visit:        SSRIs Protocol Failed - 1/16/2020  2:32 PM        Failed - Recent (12 mo) or future (30 days) visit within the authorizing provider's specialty     Patient has had an office visit with the authorizing provider or a provider within the authorizing providers department within the previous 12 mos or has a future within next 30 days. See \"Patient Info\" tab in inbasket, or \"Choose Columns\" in Meds & Orders section of the refill encounter.              Passed - Medication is active on med list        Passed - Patient is age 18 or older        Passed - No active pregnancy on record        Passed - No positive pregnancy test in last 12 months          "

## 2020-01-20 RX ORDER — SERTRALINE HYDROCHLORIDE 100 MG/1
100 TABLET, FILM COATED ORAL DAILY
Qty: 30 TABLET | Refills: 0 | Status: SHIPPED | OUTPATIENT
Start: 2020-01-20 | End: 2020-05-26

## 2020-01-20 NOTE — TELEPHONE ENCOUNTER
Routing refill request to provider for review/approval because:  Patient needs to be seen because it has been more than 1 year since last office visit.  Natalie husain for provider approval.       May Mario RN

## 2020-05-26 ENCOUNTER — VIRTUAL VISIT (OUTPATIENT)
Dept: FAMILY MEDICINE | Facility: CLINIC | Age: 25
End: 2020-05-26
Payer: COMMERCIAL

## 2020-05-26 DIAGNOSIS — N91.2 AMENORRHEA: Primary | ICD-10-CM

## 2020-05-26 DIAGNOSIS — F41.9 ANXIETY: ICD-10-CM

## 2020-05-26 PROCEDURE — 99214 OFFICE O/P EST MOD 30 MIN: CPT | Mod: 95 | Performed by: PHYSICIAN ASSISTANT

## 2020-05-26 RX ORDER — SERTRALINE HYDROCHLORIDE 100 MG/1
100 TABLET, FILM COATED ORAL DAILY
Qty: 90 TABLET | Refills: 1 | Status: SHIPPED | OUTPATIENT
Start: 2020-05-26 | End: 2020-10-20

## 2020-05-26 NOTE — PROGRESS NOTES
"Shirley Matthew is a 24 year old female who is being evaluated via a billable video visit.      The patient has been notified of following:     \"This video visit will be conducted via a call between you and your physician/provider. We have found that certain health care needs can be provided without the need for an in-person physical exam.  This service lets us provide the care you need with a video conversation.  If a prescription is necessary we can send it directly to your pharmacy.  If lab work is needed we can place an order for that and you can then stop by our lab to have the test done at a later time.    Video visits are billed at different rates depending on your insurance coverage.  Please reach out to your insurance provider with any questions.    If during the course of the call the physician/provider feels a video visit is not appropriate, you will not be charged for this service.\"    Patient has given verbal consent for Video visit? Yes    How would you like to obtain your AVS? Mail a copy    Patient would like the video invitation sent by: Send to e-mail at: [email or web address]    Will anyone else be joining your video visit? No      Subjective     Shirley Matthew is a 24 year old female who presents today via video visit for the following health issues:    HPI  Menstrual problem. LMP was 4/19/20.    Patient is sexually active. Does not use condoms.   Did take a home pregnancy test on 5/24/20 and it was negative. She did take it at night. Has not repeated it.   Stomach has been hurting and back has been hurting and having breast tenderness. Having most of the symptoms she usually does with PMS but no bleeding.      Mood- needs a refill on zoloft. Has been doing ok, but more anxiety recently. Uncertain if related to the pandemic.   No suicidal thoughts. Has been taking daily.     Video Start Time: 222        Reviewed and updated as needed this visit by Provider         Review of Systems " "  Constitutional, HEENT, cardiovascular, pulmonary, gi and gu systems are negative, except as otherwise noted.      Objective    There were no vitals taken for this visit.  Estimated body mass index is 19.67 kg/m  as calculated from the following:    Height as of 2/12/18: 1.664 m (5' 5.5\").    Weight as of 11/28/18: 54.4 kg (120 lb).  Physical Exam     GENERAL: Healthy, alert and no distress  EYES: Eyes grossly normal to inspection.  No discharge or erythema, or obvious scleral/conjunctival abnormalities.  RESP: No audible wheeze, cough, or visible cyanosis.  No visible retractions or increased work of breathing.    SKIN: Visible skin clear. No significant rash, abnormal pigmentation or lesions.  NEURO: Cranial nerves grossly intact.  Mentation and speech appropriate for age.  PSYCH: Mentation appears normal, affect nervous, judgement and insight intact, normal speech and appearance well-groomed.      Diagnostic Test Results:  none         Assessment & Plan       ICD-10-CM    1. Amenorrhea  N91.2 TSH with free T4 reflex     CBC with platelets     HCG Quantitative Pregnancy, Blood (REC697)   2. Anxiety  F41.9 sertraline (ZOLOFT) 100 MG tablet   Will get basic labs for amenorrhea, will need to still rule out pregnancy. Patient ok with pregnancy if positive.   Will refill zoloft, patient will monitor her anxiety over the next few weeks, if pregnancy test positive may consider increasing the dose.          No follow-ups on file.    Shirley Hawley PA-C  [facility]      Video-Visit Details    Type of service:  Video Visit    Video End Time:2:33 PM    Originating Location (pt. Location): Home    Distant Location (provider location):  [facility]     Platform used for Video Visit: Madeline    No follow-ups on file.       Shirley Hawley PA-C      "

## 2020-05-27 DIAGNOSIS — N91.2 AMENORRHEA: ICD-10-CM

## 2020-05-27 LAB
B-HCG SERPL-ACNC: 45 IU/L (ref 0–5)
ERYTHROCYTE [DISTWIDTH] IN BLOOD BY AUTOMATED COUNT: 12.7 % (ref 10–15)
HCT VFR BLD AUTO: 40.9 % (ref 35–47)
HGB BLD-MCNC: 13.7 G/DL (ref 11.7–15.7)
MCH RBC QN AUTO: 31.1 PG (ref 26.5–33)
MCHC RBC AUTO-ENTMCNC: 33.5 G/DL (ref 31.5–36.5)
MCV RBC AUTO: 93 FL (ref 78–100)
PLATELET # BLD AUTO: 210 10E9/L (ref 150–450)
RBC # BLD AUTO: 4.41 10E12/L (ref 3.8–5.2)
TSH SERPL DL<=0.005 MIU/L-ACNC: 2.24 MU/L (ref 0.4–4)
WBC # BLD AUTO: 4.8 10E9/L (ref 4–11)

## 2020-05-27 PROCEDURE — 84702 CHORIONIC GONADOTROPIN TEST: CPT | Performed by: PHYSICIAN ASSISTANT

## 2020-05-27 PROCEDURE — 85027 COMPLETE CBC AUTOMATED: CPT | Performed by: PHYSICIAN ASSISTANT

## 2020-05-27 PROCEDURE — 36415 COLL VENOUS BLD VENIPUNCTURE: CPT | Performed by: PHYSICIAN ASSISTANT

## 2020-05-27 PROCEDURE — 84443 ASSAY THYROID STIM HORMONE: CPT | Performed by: PHYSICIAN ASSISTANT

## 2020-05-28 DIAGNOSIS — Z32.01 PREGNANCY TEST POSITIVE: Primary | ICD-10-CM

## 2020-05-28 NOTE — RESULT ENCOUNTER NOTE
Raoul Waston,     Your pregnancy test did come back positive. It is a very low number which might indicate a very early pregnancy or possibly a chemical pregnancy which is a pregnancy that will not result in a fetus. I think our next step should be to have you repeat the labs late next week to make sure the levels are increasing. This can be with a lab only visit again. If you experience bleeding prior to that you do not need to come in for labs and that would indicate this is a chemical pregnancy. Please let me know if you have any questions.   Shirley Hawley PA-C

## 2020-06-03 DIAGNOSIS — Z32.01 PREGNANCY TEST POSITIVE: ICD-10-CM

## 2020-06-03 LAB — B-HCG SERPL-ACNC: 1110 IU/L (ref 0–5)

## 2020-06-03 PROCEDURE — 36415 COLL VENOUS BLD VENIPUNCTURE: CPT | Performed by: PHYSICIAN ASSISTANT

## 2020-06-03 PROCEDURE — 84702 CHORIONIC GONADOTROPIN TEST: CPT | Performed by: PHYSICIAN ASSISTANT

## 2020-06-04 NOTE — RESULT ENCOUNTER NOTE
Raoul Watson,     So it looks like it was a very early pregnancy. Your numbers are increasing nicely. I would recommend that you call and set up an new OB visit with our nurses. After that visit they will discuss options for care during your pregnancy.   Shirley Hawley PA-C

## 2020-06-27 ENCOUNTER — NURSE TRIAGE (OUTPATIENT)
Dept: NURSING | Facility: CLINIC | Age: 25
End: 2020-06-27

## 2020-06-27 NOTE — TELEPHONE ENCOUNTER
S: Blurry vision   B: Is 10 weeks pregnant.  Around 3:50 pm today had bilateral rainbow colored squiggly colored lines then this went away her eyes became blurry.  Wears glasses.  No trauma to her eyes.  A: Per guideline to see provider today.  R: Go to the ED now. Boyfriend will drive.  Devorah Howell RN, [facility]      Reason for Disposition    [1] Blurred vision or visual changes AND [2] present now AND [3] sudden onset or new (e.g., minutes, hours, days)    Flashes of light  (Exception: brief from pressing on the eyeball)    Additional Information    Negative: SEVERE eye pain    Negative: Severe headache    Negative: Complete loss of vision in 1 or both eyes    Negative: [1] Pregnant > 20 weeks AND [2] new hand or face swelling    Negative: [1] Pregnant > 20 weeks AND [2] upper abdominal pain lasts > 1 hour    Negative: [1] Pregnant > 20 weeks AND [2] headache    Negative: [1] Pregnant > 20 weeks AND [2] sudden weight gain (i.e., more than 3 lbs or 1.4 kg in one week)    Negative: [1] Pregnant 23 or more weeks AND [2] baby is moving less today (e.g., kick count < 5 in 1 hour or < 10 in 2 hours)    Negative: Double vision    Negative: Many floaters in the eye    Protocols used: PREGNANCY - VISION LOSS OR CHANGE-A-AH      COVID 19 Nurse Triage Plan/Patient Instructions    Please be aware that novel coronavirus (COVID-19) may be circulating in the community. If you develop symptoms such as fever, cough, or SOB or if you have concerns about the presence of another infection including coronavirus (COVID-19), please contact your health care provider or visit [email or web address].     Disposition/Instructions    Patient to go to ED and follow protocol based instructions.     Bring Your Own Device:  Please also bring your smart device(s) (smart phones, tablets, laptops) and their charging cables for your personal use and to communicate with your care team during your visit.    Thank you for taking steps to  prevent the spread of this virus.  o Limit your contact with others.  o Wear a simple mask to cover your cough.  o Wash your hands well and often.    Resources    [facility]: About COVID-19: [email or web address]    CDC: What to Do If You're Sick: www.cdc.gov/coronavirus/2019-ncov/about/steps-when-sick.html    CDC: Ending Home Isolation: www.cdc.gov/coronavirus/2019-ncov/hcp/disposition-in-home-patients.html     CDC: Caring for Someone: www.cdc.gov/coronavirus/2019-ncov/if-you-are-sick/care-for-someone.html     [facility]: Interim Guidance for Hospital Discharge to Home: www.[facility].mn./diseases/coronavirus/hcp/hospdischarge.pdf    [facility] clinical trials (COVID-19 research studies): clinicalaffairs.[facility]-clinical-trials     Below are the COVID-19 hotlines at the [place] Department of Health ([facility]). Interpreters are available.   o For health questions: Call 140-690-3439 or 1-289.984.6160 (7 a.m. to 7 p.m.)  o For questions about schools and childcare: Call 297-317-3822 or 1-825.111.2746 (7 a.m. to 7 p.m.)

## 2020-07-10 ENCOUNTER — PRENATAL OFFICE VISIT (OUTPATIENT)
Dept: OBGYN | Facility: CLINIC | Age: 25
End: 2020-07-10
Payer: COMMERCIAL

## 2020-07-10 ENCOUNTER — ANCILLARY PROCEDURE (OUTPATIENT)
Dept: ULTRASOUND IMAGING | Facility: CLINIC | Age: 25
End: 2020-07-10
Attending: OBSTETRICS & GYNECOLOGY
Payer: COMMERCIAL

## 2020-07-10 VITALS
WEIGHT: 132.8 LBS | DIASTOLIC BLOOD PRESSURE: 72 MMHG | BODY MASS INDEX: 22.13 KG/M2 | OXYGEN SATURATION: 98 % | HEIGHT: 65 IN | SYSTOLIC BLOOD PRESSURE: 112 MMHG | HEART RATE: 69 BPM

## 2020-07-10 DIAGNOSIS — O26.841 SIGNIFICANT DISCREPANCY BETWEEN UTERINE SIZE AND CLINICAL DATES, ANTEPARTUM, FIRST TRIMESTER: ICD-10-CM

## 2020-07-10 DIAGNOSIS — Z34.01 ENCOUNTER FOR SUPERVISION OF NORMAL FIRST PREGNANCY IN FIRST TRIMESTER: ICD-10-CM

## 2020-07-10 DIAGNOSIS — Z34.01 ENCOUNTER FOR SUPERVISION OF NORMAL FIRST PREGNANCY IN FIRST TRIMESTER: Primary | ICD-10-CM

## 2020-07-10 LAB
BASOPHILS # BLD AUTO: 0 10E9/L (ref 0–0.2)
BASOPHILS NFR BLD AUTO: 0.2 %
DIFFERENTIAL METHOD BLD: NORMAL
EOSINOPHIL # BLD AUTO: 0.1 10E9/L (ref 0–0.7)
EOSINOPHIL NFR BLD AUTO: 1.3 %
ERYTHROCYTE [DISTWIDTH] IN BLOOD BY AUTOMATED COUNT: 12.4 % (ref 10–15)
HCT VFR BLD AUTO: 41.2 % (ref 35–47)
HGB BLD-MCNC: 14.1 G/DL (ref 11.7–15.7)
LYMPHOCYTES # BLD AUTO: 1.3 10E9/L (ref 0.8–5.3)
LYMPHOCYTES NFR BLD AUTO: 23.8 %
MCH RBC QN AUTO: 31.8 PG (ref 26.5–33)
MCHC RBC AUTO-ENTMCNC: 34.2 G/DL (ref 31.5–36.5)
MCV RBC AUTO: 93 FL (ref 78–100)
MONOCYTES # BLD AUTO: 0.5 10E9/L (ref 0–1.3)
MONOCYTES NFR BLD AUTO: 8.7 %
NEUTROPHILS # BLD AUTO: 3.6 10E9/L (ref 1.6–8.3)
NEUTROPHILS NFR BLD AUTO: 66 %
PLATELET # BLD AUTO: 192 10E9/L (ref 150–450)
RBC # BLD AUTO: 4.44 10E12/L (ref 3.8–5.2)
WBC # BLD AUTO: 5.4 10E9/L (ref 4–11)

## 2020-07-10 PROCEDURE — 86850 RBC ANTIBODY SCREEN: CPT | Performed by: OBSTETRICS & GYNECOLOGY

## 2020-07-10 PROCEDURE — 36415 COLL VENOUS BLD VENIPUNCTURE: CPT | Performed by: OBSTETRICS & GYNECOLOGY

## 2020-07-10 PROCEDURE — 85025 COMPLETE CBC W/AUTO DIFF WBC: CPT | Performed by: OBSTETRICS & GYNECOLOGY

## 2020-07-10 PROCEDURE — 76801 OB US < 14 WKS SINGLE FETUS: CPT

## 2020-07-10 PROCEDURE — 86900 BLOOD TYPING SEROLOGIC ABO: CPT | Performed by: OBSTETRICS & GYNECOLOGY

## 2020-07-10 PROCEDURE — 87340 HEPATITIS B SURFACE AG IA: CPT | Performed by: OBSTETRICS & GYNECOLOGY

## 2020-07-10 PROCEDURE — 87086 URINE CULTURE/COLONY COUNT: CPT | Performed by: OBSTETRICS & GYNECOLOGY

## 2020-07-10 PROCEDURE — 86762 RUBELLA ANTIBODY: CPT | Performed by: OBSTETRICS & GYNECOLOGY

## 2020-07-10 PROCEDURE — 87389 HIV-1 AG W/HIV-1&-2 AB AG IA: CPT | Performed by: OBSTETRICS & GYNECOLOGY

## 2020-07-10 PROCEDURE — 99203 OFFICE O/P NEW LOW 30 MIN: CPT | Performed by: OBSTETRICS & GYNECOLOGY

## 2020-07-10 PROCEDURE — 86780 TREPONEMA PALLIDUM: CPT | Performed by: OBSTETRICS & GYNECOLOGY

## 2020-07-10 PROCEDURE — 76802 OB US < 14 WKS ADDL FETUS: CPT

## 2020-07-10 PROCEDURE — 86901 BLOOD TYPING SEROLOGIC RH(D): CPT | Performed by: OBSTETRICS & GYNECOLOGY

## 2020-07-10 RX ORDER — PRENATAL VIT/IRON FUM/FOLIC AC 27MG-0.8MG
1 TABLET ORAL DAILY
COMMUNITY

## 2020-07-10 ASSESSMENT — MIFFLIN-ST. JEOR: SCORE: 1353.26

## 2020-07-10 NOTE — PROGRESS NOTES
INITIAL OB ASSESSMENT............................................Date: 7/10/2020                            ---------------------    Name: Shirley Matthew.......................................................................Plan Number: [id number]    Age: 24 year old   : 1995  Phone: [phone] (home)   Address: [address] [place] [address]    Marital Status:  single  Race/Ethnicity:     OB Physician: Bora Jeter MD     LMP:  Patient's LMP from OB Dating Form:  Patient's last menstrual period was 2020 (exact date).  Regular menses? yes  Menses every month.   Length of menses: 5 days    Obstetrical History  ===================  OB History    Para Term  AB Living   1 0 0 0 0 0   SAB TAB Ectopic Multiple Live Births   0 0 0 0 0      # Outcome Date GA Lbr Ruperto/2nd Weight Sex Delivery Anes PTL Lv   1 Current                Past Medical History:  Past Medical History:   Diagnosis Date     Alopecia      Anxiety      Exercise-induced asthma      Exertional asthma     as a teenager       Past Surgical History:  History reviewed. No pertinent surgical history.    Current Outpatient Medications   Medication Sig Dispense Refill     Prenatal Vit-Fe Fumarate-FA (PRENATAL MULTIVITAMIN W/IRON) 27-0.8 MG tablet Take 1 tablet by mouth daily       sertraline (ZOLOFT) 100 MG tablet Take 1 tablet (100 mg) by mouth daily 90 tablet 1       No Known Allergies    Social History     Socioeconomic History     Marital status: Single     Spouse name: Not on file     Number of children: Not on file     Years of education: Not on file     Highest education level: Not on file   Occupational History     Not on file   Social Needs     Financial resource strain: Not on file     Food insecurity     Worry: Not on file     Inability: Not on file     Transportation needs     Medical: Not on file     Non-medical: Not on file   Tobacco Use     Smoking status: Never  "Smoker     Smokeless tobacco: Never Used   Substance and Sexual Activity     Alcohol use: No     Drug use: No     Sexual activity: Yes     Partners: Male     Birth control/protection: Spermicide   Lifestyle     Physical activity     Days per week: Not on file     Minutes per session: Not on file     Stress: Not on file   Relationships     Social connections     Talks on phone: Not on file     Gets together: Not on file     Attends Yarsanism service: Not on file     Active member of club or organization: Not on file     Attends meetings of clubs or organizations: Not on file     Relationship status: Not on file     Intimate partner violence     Fear of current or ex partner: Not on file     Emotionally abused: Not on file     Physically abused: Not on file     Forced sexual activity: Not on file   Other Topics Concern     Parent/sibling w/ CABG, MI or angioplasty before 65F 55M? Not Asked   Social History Narrative     Not on file     Single, Significant Other  No substance abuse, environmental exposures, mental health risks and No financial concerns,pets. Partner support.       Family History   Problem Relation Age of Onset     Colon Cancer Father 52     Alcoholism Father      Anxiety Disorder Sister      Anxiety Disorder Brother      Alcoholism Brother      Thyroid Disease Maternal Grandmother      Thyroid Disease Paternal Grandmother      Anxiety Disorder Sister      Alcoholism Mother      Suicide Cousin      Schizophrenia Cousin        Past Medical History of Father of Baby:   No significant medical history     No known genetic disease in patient's 1st or 2nd degree relatives  No known genetic diseases in the FOB's 1st or 2nd degree relatives    REVIEW OF SYMPTOMS:   History Since Last Menstrual Period:    nausea, fatigue and breast tenderness     PHYSICAL EXAM:  /72 (BP Location: Right arm, Cuff Size: Adult Regular)   Pulse 69   Ht 1.651 m (5' 5\")   Wt 60.2 kg (132 lb 12.8 oz)   LMP 04/19/2020 (Exact " Date)   SpO2 98%   BMI 22.10 kg/m    General:  WNWD female, NAD  Oriented:  X 3  Alert  PSYCH:  mentation appears normal., affect and mood normal  HEENT:  NC/AT, EOMI  NECK:  Neck supple. No adenopathy. Thyroid symmetric, normal size,, Carotids without bruits.  HEART:  RRR  LUNGS:  Clear to auscultation.  Good respiratory effort  BREASTS: declined  ABDOMEN: Benign, Soft, flat, non-tender, No masses, organomegaly and Bowel sounds normoactive no FHM seen on ultrasound, suspect Di/Di gestation   VULVA: no masses or lesions seen  BUS:  B[place]olin's, Urethra, [place]'s normal  VAGINA:  No masses or lesions seen.   CERVIX:  Nulliparous, closed, mobile,  no discharge  UTERUS:  Normal shape, position and consistency, Retroverted, mobile and Nontender; 6-8 week size  ADNEXA:  No masses palpated, non-tender  EXTREMITIES:No cyanosis, clubbing, warm and well perfused and No edema   GAIT: normal including tandem walk, heel and toe walk.        Assessment:   IUP at 11+ weeks gestation  Di/Di twin gestation      Plan:  Options for  testing for chromosomal and birth defects were discussed with the patient.  Diagnostic tests include CVS and Amniocentesis.  We discussed that these tests are definitive but invasive and do carry a risk of fetal loss.    Screening tests include nuchal translucency/blood marker testing in the first trimester and quad screening in the second trimester.  We discussed that these are screening tests and not diagnostic tests and that false positives and negatives are a distinct possibility.  The patient will discuss with partner.  We discussed physician coverage, tertiary support, diet, exercise, weight gain, schedule of visits, routine and indicated ultrasounds, and childbirth education.   Labs done today  RTC 4 weeks

## 2020-07-11 LAB
BACTERIA SPEC CULT: NO GROWTH
SPECIMEN SOURCE: NORMAL
T PALLIDUM AB SER QL: NONREACTIVE

## 2020-07-13 LAB
ABO + RH BLD: NORMAL
ABO + RH BLD: NORMAL
BLD GP AB SCN SERPL QL: NORMAL
BLOOD BANK CMNT PATIENT-IMP: NORMAL
HBV SURFACE AG SERPL QL IA: NONREACTIVE
HIV 1+2 AB+HIV1 P24 AG SERPL QL IA: NONREACTIVE
RUBV IGG SERPL IA-ACNC: 12 IU/ML
SPECIMEN EXP DATE BLD: NORMAL

## 2020-07-14 ENCOUNTER — TELEPHONE (OUTPATIENT)
Dept: OBGYN | Facility: CLINIC | Age: 25
End: 2020-07-14

## 2020-07-14 NOTE — TELEPHONE ENCOUNTER
, 12w2d. Pt's last prenatal visit was on 7/10.  Pt had an US on 7/10/2020.  It does not look like the US results have not been reviewed or interpreted yet by ordering provider.     Spoke with pt.  She states she has been noticing some vaginal spotting since last Wednesday.  She states that yesterday evening it seemed to get worse.  However, it continues to just be spotting/very light bleeding.  She is wearing a panty liner but is not soaking the panty liner.    Pt denies abdominal cramping, pain, vaginal symptoms or urinary symptoms.  She had a normal BM yesterday.  Pt states she did have intercourse on .    Advised to drink plenty of water and pelvic rest (no intercourse) and monitor bleeding.  If bleeding increases to where she is soaking through a regular sized pad in less than an hour she needs to be further evaluated in the ER.    Routing to Dr. Jeter to review and interpret OB labs and US from 7/10 as well as advise if any further recommendations.    Birgit Rowley RN

## 2020-07-14 NOTE — TELEPHONE ENCOUNTER
Reason for call:  Patient reporting a symptom    Symptom or request: spotting blood    Duration (how long have symptoms been present): 1 week    Have you been treated for this before? Yes    Additional comments: Patient states spotting has worsen since last Wednesday.    Phone Number patient can be reached at:  Home number on file [phone] (home)    Best Time:  asap    Can we leave a detailed message on this number:  YES    Call taken on 7/14/2020 at 9:23 AM by Joselito Melara

## 2020-07-14 NOTE — TELEPHONE ENCOUNTER
Relayed Dr. Jeter's message below to pt.   Pt verbalized understanding and agreed to plan.    Birgit Rowley RN

## 2020-07-14 NOTE — TELEPHONE ENCOUNTER
The ultrasound results were reviewed with her by phone on 07/10/2020.  She was advised to have a repeat ultrasound 2 weeks later and that order was placed that day.  She had that scheduled.   If bleeding becomes heavy, passing tissue, etc, then she should go in to be seen.

## 2020-07-15 ENCOUNTER — TRANSFERRED RECORDS (OUTPATIENT)
Dept: HEALTH INFORMATION MANAGEMENT | Facility: CLINIC | Age: 25
End: 2020-07-15

## 2020-07-16 ENCOUNTER — TELEPHONE (OUTPATIENT)
Dept: OBGYN | Facility: CLINIC | Age: 25
End: 2020-07-16

## 2020-07-16 NOTE — TELEPHONE ENCOUNTER
Reason for call:  Other   Patient called regarding (reason for call): call back  Additional comments: Patient is calling because she was told to see Dr. Jeter tomorrow as she miscarried but Dr. Jeter is booked. Please call back to discuss    Phone number to reach patient:  Home number on file [phone] (home)    Best Time:  any    Can we leave a detailed message on this number?  YES    Travel screening: Not Applicable

## 2020-07-16 NOTE — TELEPHONE ENCOUNTER
methylergonovine (METHERGINE) 0.2 mg tablet    Indications: Incomplete miscarriage Take 1 tablet by mouth 3 times daily for 6 doses. 6 tablet   0 07/16/2020 07/18/2020     RN called and scheduled pt with Dr. Agbeh tomorrow to f/u. Pt did not have D&C, is not having heavy bleeding but was prescribed above medication.    Pt was told by [facility] they cannot fill this, RN reaching out to [facility] to deterimine why this medication cannot be filled.    [facility] states they ordered this medication and it should be filled on 7/17/2020.    Patient verbalized understanding and agreed to plan.   Patient was given the opportunity to ask additional questions and have them answered. Patient had no further questions.   Yolis Zapata RN on 7/16/2020 at 3:54 PM

## 2020-07-17 ENCOUNTER — OFFICE VISIT (OUTPATIENT)
Dept: OBGYN | Facility: CLINIC | Age: 25
End: 2020-07-17
Payer: COMMERCIAL

## 2020-07-17 VITALS
DIASTOLIC BLOOD PRESSURE: 77 MMHG | WEIGHT: 130.4 LBS | SYSTOLIC BLOOD PRESSURE: 110 MMHG | OXYGEN SATURATION: 96 % | HEART RATE: 101 BPM | BODY MASS INDEX: 21.7 KG/M2

## 2020-07-17 DIAGNOSIS — O03.9 COMPLETE ABORTION: Primary | ICD-10-CM

## 2020-07-17 PROCEDURE — 99214 OFFICE O/P EST MOD 30 MIN: CPT | Performed by: OBSTETRICS & GYNECOLOGY

## 2020-07-18 NOTE — PROGRESS NOTES
Shirley is a 24 year old  (Patient's last menstrual period was 2020 (exact date).) at 12 weeks based on LMP.  She was seen by me last week for her first ob and she was noted to have twin gestation, without fetal poles seen on the ultrasound.  The plan was to repeat the ultrasound.  However, 2 days ago, she had bleeding and went to [place] and was transferred to [facility].  At that time it was thought that she had miscarried as the ultrasound did not show the gestational sacs seen previously.  She was discharged home with out surgical intervention.     The ultrasound report is reviewed with her today.      EXAM: US OB ANY TRI TV  LOCATION: [facility]  DATE/TIME: 7/15/2020 11:14 PM  INDICATION: Vaginal bleeding.  COMPARISON: None.  TECHNIQUE: Transabdominal scans were performed. Endovaginal ultrasound was performed to better visualize the embryo.  FINDINGS:  UTERUS: Markedly distended endometrial cavity with echogenic internal components. Findings most compatible with blood products within the endometrial cavity. No evidence for visualized intrauterine pregnancy. The uterus measures 7.3 x 4.8 x 5.1 cm.  RIGHT OVARY: 2.9 x 1.9 x 2.6 cm. Multiple follicles involve the right ovary.  Flow present to the right ovary on waveform analysis. Adjacent to the right ovary there is a 1.2 x 1.2 x 1.1 cm exophytic or paraovarian cyst.  LEFT OVARY: 3.2 x 2.4 x 2.2 cm. Multiple small follicles involve the left ovary.  Flow present to left ovary on waveform analysis.  Trace free fluid in the pelvis.  IMPRESSION:   1.  No evidence for intrauterine pregnancy. Endometrial cavity markedly distended by echogenic material likely relating to acute blood products given the patient's history of vaginal bleeding. Recommend follow-up ultrasound in 6-8 weeks to assess for return to normal appearing endometrial cavity and to exclude retained products of conception.  2.  Simple cystic lesion adjacent to or emanating from the right ovary  possibly  representing an exophytic cyst versus paraovarian cyst and measuring 1.2 x 1.2 x 1 cm. Recommend evaluation at aforementioned follow-up to assess for stability or resolution.  3.  Normal appearance to the left ovary.  4.  Flow present to both ovaries on waveform analysis.  5.  Trace free fluid in the pelvis.        Past Medical History:   Diagnosis Date     Alopecia      Anxiety      Exercise-induced asthma      Exertional asthma     as a teenager       Past Surgical History:   Procedure Laterality Date     NO HISTORY OF SURGERY          No Known Allergies    Current Outpatient Medications   Medication Sig Dispense Refill     Prenatal Vit-Fe Fumarate-FA (PRENATAL MULTIVITAMIN W/IRON) 27-0.8 MG tablet Take 1 tablet by mouth daily       sertraline (ZOLOFT) 100 MG tablet Take 1 tablet (100 mg) by mouth daily 90 tablet 1       Social History     Socioeconomic History     Marital status: Single     Spouse name: Not on file     Number of children: Not on file     Years of education: Not on file     Highest education level: Not on file   Occupational History     Not on file   Social Needs     Financial resource strain: Not on file     Food insecurity     Worry: Not on file     Inability: Not on file     Transportation needs     Medical: Not on file     Non-medical: Not on file   Tobacco Use     Smoking status: Never Smoker     Smokeless tobacco: Never Used   Substance and Sexual Activity     Alcohol use: No     Drug use: No     Sexual activity: Yes     Partners: Male     Birth control/protection: Spermicide   Lifestyle     Physical activity     Days per week: Not on file     Minutes per session: Not on file     Stress: Not on file   Relationships     Social connections     Talks on phone: Not on file     Gets together: Not on file     Attends Baptism service: Not on file     Active member of club or organization: Not on file     Attends meetings of clubs or organizations: Not on file     Relationship status:  Not on file     Intimate partner violence     Fear of current or ex partner: Not on file     Emotionally abused: Not on file     Physically abused: Not on file     Forced sexual activity: Not on file   Other Topics Concern     Parent/sibling w/ CABG, MI or angioplasty before 65F 55M? Not Asked   Social History Narrative     Not on file       Family History   Problem Relation Age of Onset     Colon Cancer Father 52     Alcoholism Father      Anxiety Disorder Sister      Anxiety Disorder Brother      Alcoholism Brother      Thyroid Disease Maternal Grandmother      Thyroid Disease Paternal Grandmother      Anxiety Disorder Sister      Alcoholism Mother      Suicide Cousin      Schizophrenia Cousin          Review of Systems:  10 point ROS of systems including Constitutional, Eyes, Respiratory, Cardiovascular, Gastroenterology, Genitourinary, Integumentary, Muscularskeletal, Psychiatric were all negative except for pertinent positives noted in my HPI and in the PMH.      Exam  /77 (BP Location: Right arm, Cuff Size: Adult Regular)   Pulse 101   Wt 59.1 kg (130 lb 6.4 oz)   LMP 2020 (Exact Date)   SpO2 96%   BMI 21.70 kg/m    General:  WNWD female, NAD  Alert  Oriented x 3  Gait:  Normal  Skin:  Normal skin turgor  HEENT:  NC/AT, EOMI  Abdomen:  Non-tender, non-distended.  Pelvic exam:  Not performed  Extremities:  No clubbing, no cyanosis and no edema.      Assessment:  Complete       Plan:  We reviewed that miscarriage occurs in at least ~ 1 in 5 pregnancy events and that there was no direct event or prevention  that the patient could have avoided or performed.  Discussed that there are many etiologies for miscarriage, the most common being a genetic anomaly. The risk for a miscarriage increases with advancing maternal age due to a higher incidence of conceptuses with a chromosomal aneuploidy. The risk may approach 75% in women who are 45 years of age and older.  In about 50% of couples with  recurrent pregnancy loss, the etiology remains unknown despite a thorough evaluation and is therefore classified as idiopathic. It is estimated that couples with idiopathic recurrent pregnancy loss can have up to a 75% chance of having a successful pregnancy. Reviewed that risk of miscarriage for next pregnancy is not elevated by the current event.  Commonly reported causes of recurrent pregnancy loss include the following:  Endocrine  Environmental agents  Maternal factors (acquired, inherited, structural)  Chromosomal and single gene disorders    While she is uncertain about a pregnancy in the near future, she will restart folic acid, whether in PNV, multi vitamin, or folic acid by itself.      TT face to face 30 min  CT and review of records, greater than 75%    Bora Jeter MD

## 2020-08-17 ENCOUNTER — MYC MEDICAL ADVICE (OUTPATIENT)
Dept: OBGYN | Facility: CLINIC | Age: 25
End: 2020-08-17

## 2020-08-17 DIAGNOSIS — Z87.59 HISTORY OF MISCARRIAGE: Primary | ICD-10-CM

## 2020-08-19 DIAGNOSIS — Z87.59 HISTORY OF MISCARRIAGE: ICD-10-CM

## 2020-08-19 LAB — HCG UR QL: NEGATIVE

## 2020-08-19 PROCEDURE — 81025 URINE PREGNANCY TEST: CPT | Performed by: OBSTETRICS & GYNECOLOGY

## 2020-08-28 ENCOUNTER — MYC MEDICAL ADVICE (OUTPATIENT)
Dept: OBGYN | Facility: CLINIC | Age: 25
End: 2020-08-28

## 2020-08-28 NOTE — TELEPHONE ENCOUNTER
"Pt last seen on 7/17/2020 for possible miscarriage.    8/19/2020 HCG qualitative test was negative.    Pt has had bleeding since 8/10/2020, passed pin pong ball sized clot on 8/21/2020, passing smaller \"raisin\" sized clots. Changes pad 1 every 10 hours.    Pt asking if she should be concerned regarding ongoing bleeding and passing such a large clot. RN routing to provider for advisement.    Yolis Zapata RN on 8/28/2020 at 4:10 PM    "

## 2020-10-18 DIAGNOSIS — F41.9 ANXIETY: ICD-10-CM

## 2020-10-20 RX ORDER — SERTRALINE HYDROCHLORIDE 100 MG/1
TABLET, FILM COATED ORAL
Qty: 90 TABLET | Refills: 0 | Status: SHIPPED | OUTPATIENT
Start: 2020-10-20 | End: 2021-01-18

## 2020-10-22 NOTE — TELEPHONE ENCOUNTER
Roomster message sent.    Thank you!    Cynthia BARILLAS  [facility]

## 2020-11-05 ENCOUNTER — MYC MEDICAL ADVICE (OUTPATIENT)
Dept: OBGYN | Facility: CLINIC | Age: 25
End: 2020-11-05

## 2020-11-24 ENCOUNTER — PRENATAL OFFICE VISIT (OUTPATIENT)
Dept: OBGYN | Facility: CLINIC | Age: 25
End: 2020-11-24
Payer: COMMERCIAL

## 2020-11-24 VITALS
WEIGHT: 139.6 LBS | DIASTOLIC BLOOD PRESSURE: 75 MMHG | BODY MASS INDEX: 23.23 KG/M2 | SYSTOLIC BLOOD PRESSURE: 116 MMHG | OXYGEN SATURATION: 99 % | HEART RATE: 71 BPM

## 2020-11-24 DIAGNOSIS — O09.291 PREGNANCY WITH HISTORY OF MISCARRIAGE, FIRST TRIMESTER: Primary | ICD-10-CM

## 2020-11-24 DIAGNOSIS — Z12.4 PAP SMEAR FOR CERVICAL CANCER SCREENING: ICD-10-CM

## 2020-11-24 LAB
ABO + RH BLD: NORMAL
ABO + RH BLD: NORMAL
BASOPHILS # BLD AUTO: 0 10E9/L (ref 0–0.2)
BASOPHILS NFR BLD AUTO: 0.1 %
BLD GP AB SCN SERPL QL: NORMAL
BLOOD BANK CMNT PATIENT-IMP: NORMAL
DIFFERENTIAL METHOD BLD: NORMAL
EOSINOPHIL # BLD AUTO: 0.1 10E9/L (ref 0–0.7)
EOSINOPHIL NFR BLD AUTO: 0.8 %
ERYTHROCYTE [DISTWIDTH] IN BLOOD BY AUTOMATED COUNT: 12.5 % (ref 10–15)
HCT VFR BLD AUTO: 39.1 % (ref 35–47)
HGB BLD-MCNC: 13.3 G/DL (ref 11.7–15.7)
LYMPHOCYTES # BLD AUTO: 1.5 10E9/L (ref 0.8–5.3)
LYMPHOCYTES NFR BLD AUTO: 20.2 %
MCH RBC QN AUTO: 30.4 PG (ref 26.5–33)
MCHC RBC AUTO-ENTMCNC: 34 G/DL (ref 31.5–36.5)
MCV RBC AUTO: 90 FL (ref 78–100)
MONOCYTES # BLD AUTO: 0.5 10E9/L (ref 0–1.3)
MONOCYTES NFR BLD AUTO: 7.3 %
NEUTROPHILS # BLD AUTO: 5.2 10E9/L (ref 1.6–8.3)
NEUTROPHILS NFR BLD AUTO: 71.6 %
PLATELET # BLD AUTO: 200 10E9/L (ref 150–450)
RBC # BLD AUTO: 4.37 10E12/L (ref 3.8–5.2)
SPECIMEN EXP DATE BLD: NORMAL
WBC # BLD AUTO: 7.3 10E9/L (ref 4–11)

## 2020-11-24 PROCEDURE — 86850 RBC ANTIBODY SCREEN: CPT | Performed by: OBSTETRICS & GYNECOLOGY

## 2020-11-24 PROCEDURE — 86900 BLOOD TYPING SEROLOGIC ABO: CPT | Performed by: OBSTETRICS & GYNECOLOGY

## 2020-11-24 PROCEDURE — 36415 COLL VENOUS BLD VENIPUNCTURE: CPT | Performed by: OBSTETRICS & GYNECOLOGY

## 2020-11-24 PROCEDURE — 86780 TREPONEMA PALLIDUM: CPT | Mod: 90 | Performed by: OBSTETRICS & GYNECOLOGY

## 2020-11-24 PROCEDURE — 87086 URINE CULTURE/COLONY COUNT: CPT | Performed by: OBSTETRICS & GYNECOLOGY

## 2020-11-24 PROCEDURE — 87389 HIV-1 AG W/HIV-1&-2 AB AG IA: CPT | Performed by: OBSTETRICS & GYNECOLOGY

## 2020-11-24 PROCEDURE — 86762 RUBELLA ANTIBODY: CPT | Performed by: OBSTETRICS & GYNECOLOGY

## 2020-11-24 PROCEDURE — G0145 SCR C/V CYTO,THINLAYER,RESCR: HCPCS | Performed by: OBSTETRICS & GYNECOLOGY

## 2020-11-24 PROCEDURE — 85025 COMPLETE CBC W/AUTO DIFF WBC: CPT | Performed by: OBSTETRICS & GYNECOLOGY

## 2020-11-24 PROCEDURE — 99000 SPECIMEN HANDLING OFFICE-LAB: CPT | Performed by: OBSTETRICS & GYNECOLOGY

## 2020-11-24 PROCEDURE — 99207 PR FIRST OB VISIT: CPT | Performed by: OBSTETRICS & GYNECOLOGY

## 2020-11-24 PROCEDURE — 86901 BLOOD TYPING SEROLOGIC RH(D): CPT | Performed by: OBSTETRICS & GYNECOLOGY

## 2020-11-24 PROCEDURE — 87340 HEPATITIS B SURFACE AG IA: CPT | Performed by: OBSTETRICS & GYNECOLOGY

## 2020-11-24 ASSESSMENT — PAIN SCALES - GENERAL: PAINLEVEL: NO PAIN (0)

## 2020-11-24 NOTE — PROGRESS NOTES
INITIAL OB ASSESSMENT............................................Date: 2020                            ---------------------    Name: Shirley Matthew.......................................................................Plan Number: [id number]    Age: 24 year old   : 1995  Phone: [phone] (home)   Address: [address] [place] [address]    Marital Status:  co-habitating  Race/Ethnicity:   Occupation:     Partner's Name:  Manny Stanley    OB Physician: Bora Jeter MD       LMP:  Patient's LMP from OB Dating Form:  Patient's last menstrual period was 10/01/2020.  Regular menses? yes  Menses every month.   Length of menses: 5 days    Obstetrical History  ===================  OB History    Para Term  AB Living   2 0 0 0 0 0   SAB TAB Ectopic Multiple Live Births   0 0 0 0 0      # Outcome Date GA Lbr Ruperto/2nd Weight Sex Delivery Anes PTL Lv   2 Current            1                 Past Medical History:  Past Medical History:   Diagnosis Date     Alopecia      Anxiety      Exercise-induced asthma      Exertional asthma     as a teenager       Past Surgical History:  Past Surgical History:   Procedure Laterality Date     NO HISTORY OF SURGERY         Current Outpatient Medications   Medication Sig Dispense Refill     Prenatal Vit-Fe Fumarate-FA (PRENATAL MULTIVITAMIN W/IRON) 27-0.8 MG tablet Take 1 tablet by mouth daily       sertraline (ZOLOFT) 100 MG tablet TAKE 1 TABLET BY MOUTH EVERY DAY 90 tablet 0       No Known Allergies    Social History     Socioeconomic History     Marital status: Single     Spouse name: Not on file     Number of children: Not on file     Years of education: Not on file     Highest education level: Not on file   Occupational History     Not on file   Social Needs     Financial resource strain: Not on file     Food insecurity     Worry: Not on file     Inability: Not on file      Transportation needs     Medical: Not on file     Non-medical: Not on file   Tobacco Use     Smoking status: Never Smoker     Smokeless tobacco: Never Used   Substance and Sexual Activity     Alcohol use: No     Drug use: No     Sexual activity: Yes     Partners: Male     Birth control/protection: Spermicide   Lifestyle     Physical activity     Days per week: Not on file     Minutes per session: Not on file     Stress: Not on file   Relationships     Social connections     Talks on phone: Not on file     Gets together: Not on file     Attends Bahai service: Not on file     Active member of club or organization: Not on file     Attends meetings of clubs or organizations: Not on file     Relationship status: Not on file     Intimate partner violence     Fear of current or ex partner: Not on file     Emotionally abused: Not on file     Physically abused: Not on file     Forced sexual activity: Not on file   Other Topics Concern     Parent/sibling w/ CABG, MI or angioplasty before 65F 55M? Not Asked   Social History Narrative     Not on file       Single, Significant Other  No substance abuse, environmental exposures, mental health risks and No financial concerns,pets. Partner support.       Family History   Problem Relation Age of Onset     Colon Cancer Father 52     Alcoholism Father      Anxiety Disorder Sister      Anxiety Disorder Brother      Alcoholism Brother      Thyroid Disease Maternal Grandmother      Thyroid Disease Paternal Grandmother      Anxiety Disorder Sister      Alcoholism Mother      Suicide Cousin      Schizophrenia Cousin        Past Medical History of Father of Baby:   No significant medical history     No known genetic disease in patient's 1st or 2nd degree relatives  No known genetic diseases in the FOB's 1st or 2nd degree relatives      REVIEW OF SYMPTOMS:   History Since Last Menstrual Period:    nausea, fatigue and breast tenderness       PHYSICAL EXAM:  /75 (BP Location: Left  arm, Patient Position: Chair, Cuff Size: Adult Regular)   Pulse 71   Wt 63.3 kg (139 lb 9.6 oz)   LMP 10/01/2020   SpO2 99%   Breastfeeding No   BMI 23.23 kg/m    General:  WNWD female, NAD  Oriented:  X 3  Alert  PSYCH:  mentation appears normal., affect and mood normal  HEENT:  NC/AT, EOMI  NECK:  Neck supple. No adenopathy. Thyroid symmetric, normal size,, Carotids without bruits.  HEART:  RRR  LUNGS:  Clear to auscultation.  Good respiratory effort  BREASTS: Not performed   ABDOMEN: Benign, Soft, flat, non-tender, No masses, organomegaly and Bowel sounds normoactive   VULVA: no masses or lesions seen  BUS:  Bartholin's, Urethra, Eagan's normal  VAGINA:  No masses or lesions seen.   CERVIX:  Nulliparous, closed, mobile,  no discharge  UTERUS:  Normal shape, position and consistency and Nontender; 6-8 week size  ADNEXA:  No masses palpated, non-tender  EXTREMITIES:No cyanosis, clubbing, warm and well perfused and No edema   GAIT: normal including tandem walk, heel and toe walk.        Assessment:   IUP at 7w 5 d  Pregnancy with history of miscarriage.        Plan:  Options for  testing for chromosomal and birth defects were discussed with the patient.  Diagnostic tests include CVS and Amniocentesis.  We discussed that these tests are definitive but invasive and do carry a risk of fetal loss.    Screening tests include nuchal translucency/blood marker testing in the first trimester and quad screening in the second trimester.  We discussed that these are screening tests and not diagnostic tests and that false positives and negatives are a distinct possibility.  The patient will discuss with her partner and decide .  We discussed physician coverage, tertiary support, diet, exercise, weight gain, schedule of visits, routine and indicated ultrasounds, and childbirth education.   Ultrasound ordered for history of miscarriage.   Labs done today  RTC 4 weeks    Bora Jeter MD

## 2020-11-25 ENCOUNTER — ANCILLARY PROCEDURE (OUTPATIENT)
Dept: ULTRASOUND IMAGING | Facility: CLINIC | Age: 25
End: 2020-11-25
Attending: OBSTETRICS & GYNECOLOGY
Payer: COMMERCIAL

## 2020-11-25 DIAGNOSIS — O09.291 PREGNANCY WITH HISTORY OF MISCARRIAGE, FIRST TRIMESTER: ICD-10-CM

## 2020-11-25 LAB
BACTERIA SPEC CULT: NO GROWTH
HBV SURFACE AG SERPL QL IA: NONREACTIVE
HIV 1+2 AB+HIV1 P24 AG SERPL QL IA: NONREACTIVE
Lab: NORMAL
RUBV IGG SERPL IA-ACNC: 13 IU/ML
SPECIMEN SOURCE: NORMAL
T PALLIDUM AB SER QL: NONREACTIVE

## 2020-11-30 LAB
COPATH REPORT: NORMAL
PAP: NORMAL

## 2020-12-23 ENCOUNTER — PRENATAL OFFICE VISIT (OUTPATIENT)
Dept: OBGYN | Facility: CLINIC | Age: 25
End: 2020-12-23
Payer: COMMERCIAL

## 2020-12-23 VITALS
DIASTOLIC BLOOD PRESSURE: 79 MMHG | SYSTOLIC BLOOD PRESSURE: 137 MMHG | HEART RATE: 75 BPM | OXYGEN SATURATION: 99 % | BODY MASS INDEX: 23.16 KG/M2 | WEIGHT: 139.2 LBS

## 2020-12-23 DIAGNOSIS — O09.291 PREGNANCY WITH HISTORY OF MISCARRIAGE, FIRST TRIMESTER: Primary | ICD-10-CM

## 2020-12-23 PROCEDURE — 99207 PR PRENATAL VISIT: CPT | Performed by: OBSTETRICS & GYNECOLOGY

## 2020-12-23 NOTE — PROGRESS NOTES
11w6d   Eating well.    Discussed genetic screening.  They will think about the options.   She has had a couple of migraines with auras.  She will use Tylenol and rare Ibuprofen if absolutely needed.    RTC 4 weeks   Bora Jeter MD

## 2021-01-07 ENCOUNTER — ANCILLARY PROCEDURE (OUTPATIENT)
Dept: GENERAL RADIOLOGY | Facility: CLINIC | Age: 26
End: 2021-01-07
Attending: FAMILY MEDICINE
Payer: COMMERCIAL

## 2021-01-07 ENCOUNTER — OFFICE VISIT (OUTPATIENT)
Dept: FAMILY MEDICINE | Facility: CLINIC | Age: 26
End: 2021-01-07
Payer: COMMERCIAL

## 2021-01-07 VITALS
TEMPERATURE: 97.8 F | HEART RATE: 82 BPM | OXYGEN SATURATION: 99 % | DIASTOLIC BLOOD PRESSURE: 62 MMHG | BODY MASS INDEX: 23.16 KG/M2 | WEIGHT: 139 LBS | HEIGHT: 65 IN | SYSTOLIC BLOOD PRESSURE: 100 MMHG

## 2021-01-07 DIAGNOSIS — M79.672 LEFT FOOT PAIN: Primary | ICD-10-CM

## 2021-01-07 DIAGNOSIS — M79.672 LEFT FOOT PAIN: ICD-10-CM

## 2021-01-07 PROCEDURE — 99213 OFFICE O/P EST LOW 20 MIN: CPT | Performed by: FAMILY MEDICINE

## 2021-01-07 PROCEDURE — 73630 X-RAY EXAM OF FOOT: CPT | Mod: LT | Performed by: RADIOLOGY

## 2021-01-07 ASSESSMENT — PAIN SCALES - GENERAL: PAINLEVEL: NO PAIN (0)

## 2021-01-07 ASSESSMENT — MIFFLIN-ST. JEOR: SCORE: 1375.76

## 2021-01-07 NOTE — PROGRESS NOTES
Assessment & Plan     Left foot pain  Negative XR  Advised with elevation, ICE may take Tylenol as needed  Follow up with PCP if needed  - XR Foot Left G/E 3 Views; Future    Review of the result(s) of each unique test - Xr of foot             There are no Patient Instructions on file for this visit.    No follow-ups on file.    Reza Lombardo MD  [facility] is a 25 year old who presents to clinic today for the following health issues   Patient reports started having pain in her left foot, lateral aspect a week ago after she ran on a treadmill for 20 minutes, for the first time.  She has no swelling, no other injury.  No stiffness.  No pain at ankle    She is 14 weeks pregnant.     Musculoskeletal problem/pain  Onset/Duration: about a week  Description  Location: foot - left  Joint Swelling: no  Redness: no  Pain: YES  Warmth: no  Intensity:  severe, 7/10  Progression of Symptoms:  worsening and constant  Accompanying signs and symptoms:   Fevers: no  Numbness/tingling/weakness: YES- tinglimg  History  Trauma to the area: no  Recent illness:  no  Previous similar problem: no  Previous evaluation:  no  Precipitating or alleviating factors:  Aggravating factors include: standing and walking  Therapies tried and outcome: nothing      Review of Systems   Constitutional, HEENT, cardiovascular, pulmonary, gi and gu systems are negative, except as otherwise noted.      Objective    LMP 10/01/2020   There is no height or weight on file to calculate BMI.  Physical Exam   GENERAL: healthy, alert and no distress  MS:left foot and ankle exam:   No swelling, no sign of injury.  Full range of motion of her ankle, full strength.  No tenderness.  Minimal tenderness at the latter fifth metatarsal area.  Otherwise, normal exam  NEURO: Normal strength and tone, mentation intact and speech normal    Xray - of left foot :Reviewed and interpreted by me.  negative    Reza Lombardo  MD.

## 2021-01-15 ENCOUNTER — HEALTH MAINTENANCE LETTER (OUTPATIENT)
Age: 26
End: 2021-01-15

## 2021-01-15 DIAGNOSIS — F41.9 ANXIETY: ICD-10-CM

## 2021-01-18 RX ORDER — SERTRALINE HYDROCHLORIDE 100 MG/1
100 TABLET, FILM COATED ORAL DAILY
Qty: 90 TABLET | Refills: 0 | Status: SHIPPED | OUTPATIENT
Start: 2021-01-18 | End: 2021-02-17

## 2021-01-18 NOTE — TELEPHONE ENCOUNTER
Medication is being filled for 1 time refill only due to:  Patient needs to be seen because +++NEED APPOINTMENT+++.

## 2021-01-20 ENCOUNTER — PRENATAL OFFICE VISIT (OUTPATIENT)
Dept: OBGYN | Facility: CLINIC | Age: 26
End: 2021-01-20
Payer: COMMERCIAL

## 2021-01-20 VITALS
HEART RATE: 74 BPM | OXYGEN SATURATION: 99 % | BODY MASS INDEX: 23.73 KG/M2 | SYSTOLIC BLOOD PRESSURE: 103 MMHG | DIASTOLIC BLOOD PRESSURE: 68 MMHG | WEIGHT: 142.4 LBS

## 2021-01-20 DIAGNOSIS — O09.291 PREGNANCY WITH HISTORY OF MISCARRIAGE, FIRST TRIMESTER: Primary | ICD-10-CM

## 2021-01-20 PROCEDURE — 99000 SPECIMEN HANDLING OFFICE-LAB: CPT | Performed by: OBSTETRICS & GYNECOLOGY

## 2021-01-20 PROCEDURE — 36415 COLL VENOUS BLD VENIPUNCTURE: CPT | Performed by: OBSTETRICS & GYNECOLOGY

## 2021-01-20 PROCEDURE — 99207 PR PRENATAL VISIT: CPT | Performed by: OBSTETRICS & GYNECOLOGY

## 2021-01-20 PROCEDURE — 81511 FTL CGEN ABNOR FOUR ANAL: CPT | Mod: 90 | Performed by: OBSTETRICS & GYNECOLOGY

## 2021-01-20 NOTE — PROGRESS NOTES
15w6d   Eating well.  Discussed genetic screening. She will do the quad test and the ultrasound.   RTC 4 weeks  Bora Jeter MD

## 2021-01-22 LAB
# FETUSES US: NORMAL
# FETUSES: 1
AFP ADJ MOM AMN: 1.14
AFP SERPL-MCNC: 38 NG/ML
AGE - REPORTED: 25.6 YR
CURRENT SMOKER: NO
CURRENT SMOKER: NO
DIABETES STATUS PATIENT: NO
EGG DONOR AGE: NO
FAMILY MEMBER DISEASES HX: NO
FAMILY MEMBER DISEASES HX: NO
GA METHOD: NORMAL
GA METHOD: NORMAL
GA: NORMAL WK
HCG MOM SERPL: 1.7
HCG SERPL-ACNC: NORMAL IU/L
HX OF HEREDITARY DISORDERS: NO
IDDM PATIENT QL: NO
INHIBIN A MOM SERPL: 0.95
INHIBIN A SERPL-MCNC: 156 PG/ML
INTEGRATED SCN PATIENT-IMP: NORMAL
LMP START DATE: NORMAL
MONOCHORIONIC TWINS: NO
PATHOLOGY STUDY: NORMAL
PREV FETUS DEFECT: NO
SERVICE CMNT-IMP: NO
SPECIMEN DRAWN SERPL: NORMAL
U ESTRIOL MOM SERPL: 1.39
U ESTRIOL SERPL-MCNC: 1.47 NG/ML
VALPROIC/CARBAMAZEPINE STATUS: NO
WEIGHT UNITS: NORMAL

## 2021-02-17 ENCOUNTER — PRENATAL OFFICE VISIT (OUTPATIENT)
Dept: OBGYN | Facility: CLINIC | Age: 26
End: 2021-02-17
Payer: COMMERCIAL

## 2021-02-17 ENCOUNTER — ANCILLARY PROCEDURE (OUTPATIENT)
Dept: ULTRASOUND IMAGING | Facility: CLINIC | Age: 26
End: 2021-02-17
Attending: OBSTETRICS & GYNECOLOGY
Payer: COMMERCIAL

## 2021-02-17 VITALS
WEIGHT: 147.8 LBS | BODY MASS INDEX: 24.62 KG/M2 | SYSTOLIC BLOOD PRESSURE: 103 MMHG | HEART RATE: 72 BPM | DIASTOLIC BLOOD PRESSURE: 67 MMHG | OXYGEN SATURATION: 98 %

## 2021-02-17 DIAGNOSIS — O28.3 ABNORMAL ULTRASONIC FINDING ON ANTENATAL SCREENING OF MOTHER, ANTEPARTUM: ICD-10-CM

## 2021-02-17 DIAGNOSIS — O09.291 PREGNANCY WITH HISTORY OF MISCARRIAGE, FIRST TRIMESTER: ICD-10-CM

## 2021-02-17 DIAGNOSIS — O09.292 PREGNANCY WITH HISTORY OF MISCARRIAGE, SECOND TRIMESTER: Primary | ICD-10-CM

## 2021-02-17 DIAGNOSIS — F41.9 ANXIETY: ICD-10-CM

## 2021-02-17 PROCEDURE — 99207 PR PRENATAL VISIT: CPT | Performed by: OBSTETRICS & GYNECOLOGY

## 2021-02-17 RX ORDER — SERTRALINE HYDROCHLORIDE 100 MG/1
100 TABLET, FILM COATED ORAL DAILY
Qty: 90 TABLET | Refills: 2 | Status: SHIPPED | OUTPATIENT
Start: 2021-02-17 | End: 2021-11-30

## 2021-02-18 NOTE — PROGRESS NOTES
19w6d.   Doing well without issues/concerns.    Ultrasound today  Final report was not available at visit today, but findings suggest the need for MFM consultation.  Will place the referral.   RTC 4 weeks.   Bora Jeter MD

## 2021-02-23 ENCOUNTER — TRANSFERRED RECORDS (OUTPATIENT)
Dept: HEALTH INFORMATION MANAGEMENT | Facility: CLINIC | Age: 26
End: 2021-02-23

## 2021-03-17 ENCOUNTER — PRENATAL OFFICE VISIT (OUTPATIENT)
Dept: OBGYN | Facility: CLINIC | Age: 26
End: 2021-03-17
Payer: COMMERCIAL

## 2021-03-17 VITALS
BODY MASS INDEX: 25.72 KG/M2 | DIASTOLIC BLOOD PRESSURE: 73 MMHG | OXYGEN SATURATION: 97 % | HEART RATE: 75 BPM | SYSTOLIC BLOOD PRESSURE: 116 MMHG | WEIGHT: 154.4 LBS

## 2021-03-17 DIAGNOSIS — O09.292 PREGNANCY WITH HISTORY OF MISCARRIAGE, SECOND TRIMESTER: Primary | ICD-10-CM

## 2021-03-17 LAB
GLUCOSE 1H P 50 G GLC PO SERPL-MCNC: 109 MG/DL (ref 60–129)
HGB BLD-MCNC: 11.1 G/DL (ref 11.7–15.7)

## 2021-03-17 PROCEDURE — 82950 GLUCOSE TEST: CPT | Performed by: OBSTETRICS & GYNECOLOGY

## 2021-03-17 PROCEDURE — 36415 COLL VENOUS BLD VENIPUNCTURE: CPT | Performed by: OBSTETRICS & GYNECOLOGY

## 2021-03-17 PROCEDURE — 99207 PR PRENATAL VISIT: CPT | Performed by: OBSTETRICS & GYNECOLOGY

## 2021-03-17 PROCEDURE — 99N1025 PR STATISTIC OBHBG - HEMOGLOBIN: Performed by: OBSTETRICS & GYNECOLOGY

## 2021-03-17 NOTE — PROGRESS NOTES
23w6d    Doing well without issues/concerns.   US was performed at [facility] and normal anatomy seen.  She has repeat ultrasound with them next week.    Labs today   RTC 4wk.    Bora Jeter MD

## 2021-03-22 ENCOUNTER — TRANSFERRED RECORDS (OUTPATIENT)
Dept: HEALTH INFORMATION MANAGEMENT | Facility: CLINIC | Age: 26
End: 2021-03-22

## 2021-04-05 ENCOUNTER — TRANSFERRED RECORDS (OUTPATIENT)
Dept: HEALTH INFORMATION MANAGEMENT | Facility: CLINIC | Age: 26
End: 2021-04-05

## 2021-04-14 ENCOUNTER — OFFICE VISIT (OUTPATIENT)
Dept: OBGYN | Facility: CLINIC | Age: 26
End: 2021-04-14
Payer: COMMERCIAL

## 2021-04-14 VITALS
BODY MASS INDEX: 26.86 KG/M2 | OXYGEN SATURATION: 99 % | DIASTOLIC BLOOD PRESSURE: 71 MMHG | WEIGHT: 161.2 LBS | SYSTOLIC BLOOD PRESSURE: 110 MMHG | HEART RATE: 84 BPM

## 2021-04-14 DIAGNOSIS — O09.292 PREGNANCY WITH HISTORY OF MISCARRIAGE, SECOND TRIMESTER: Primary | ICD-10-CM

## 2021-04-14 PROCEDURE — 99207 PR PRENATAL VISIT: CPT | Performed by: OBSTETRICS & GYNECOLOGY

## 2021-04-14 NOTE — PROGRESS NOTES
27w6d   Doing well.  No problems.    Labs reviewed.   COVID vaccine discussed.  She initially did not get it when she was offered one through work.  One [facility] provider suggested waiting and another strongly suggested getting the vaccine.  We discussed the risks and benefits and the recommendations for pregnancy.  I strongly advise to proceed with obtaining the vaccine.  We have helped facilitate the vaccination.   RTC 2 weeks  Bora Jeter MD

## 2021-04-19 ENCOUNTER — TRANSFERRED RECORDS (OUTPATIENT)
Dept: HEALTH INFORMATION MANAGEMENT | Facility: CLINIC | Age: 26
End: 2021-04-19

## 2021-04-21 ENCOUNTER — IMMUNIZATION (OUTPATIENT)
Dept: NURSING | Facility: CLINIC | Age: 26
End: 2021-04-21
Payer: COMMERCIAL

## 2021-04-21 PROCEDURE — 91300 PR COVID VAC PFIZER DIL RECON 30 MCG/0.3 ML IM: CPT

## 2021-04-21 PROCEDURE — 0001A PR COVID VAC PFIZER DIL RECON 30 MCG/0.3 ML IM: CPT

## 2021-04-28 ENCOUNTER — PRENATAL OFFICE VISIT (OUTPATIENT)
Dept: OBGYN | Facility: CLINIC | Age: 26
End: 2021-04-28
Payer: COMMERCIAL

## 2021-04-28 VITALS
BODY MASS INDEX: 27.52 KG/M2 | WEIGHT: 165.2 LBS | HEART RATE: 77 BPM | OXYGEN SATURATION: 98 % | SYSTOLIC BLOOD PRESSURE: 107 MMHG | DIASTOLIC BLOOD PRESSURE: 68 MMHG

## 2021-04-28 DIAGNOSIS — O09.292 PREGNANCY WITH HISTORY OF MISCARRIAGE, SECOND TRIMESTER: Primary | ICD-10-CM

## 2021-04-28 DIAGNOSIS — O28.3 ABNORMAL ULTRASONIC FINDING ON ANTENATAL SCREENING OF MOTHER, ANTEPARTUM: ICD-10-CM

## 2021-04-28 PROCEDURE — 99207 PR PRENATAL VISIT: CPT | Performed by: OBSTETRICS & GYNECOLOGY

## 2021-04-28 NOTE — PROGRESS NOTES
29w6d   Tired.  No HA, visual changes, N/V  Tdap will hold at this time.   Seeing MFM for umbilical vein Varix.  Timing of delivery is pending.   RTC 2 weeks  Bora Jeter MD

## 2021-05-03 ENCOUNTER — TRANSFERRED RECORDS (OUTPATIENT)
Dept: HEALTH INFORMATION MANAGEMENT | Facility: CLINIC | Age: 26
End: 2021-05-03

## 2021-05-10 ENCOUNTER — TRANSFERRED RECORDS (OUTPATIENT)
Dept: HEALTH INFORMATION MANAGEMENT | Facility: CLINIC | Age: 26
End: 2021-05-10

## 2021-05-12 ENCOUNTER — IMMUNIZATION (OUTPATIENT)
Dept: NURSING | Facility: CLINIC | Age: 26
End: 2021-05-12
Attending: FAMILY MEDICINE
Payer: COMMERCIAL

## 2021-05-12 PROCEDURE — 0002A PR COVID VAC PFIZER DIL RECON 30 MCG/0.3 ML IM: CPT

## 2021-05-12 PROCEDURE — 91300 PR COVID VAC PFIZER DIL RECON 30 MCG/0.3 ML IM: CPT

## 2021-05-14 ENCOUNTER — PRENATAL OFFICE VISIT (OUTPATIENT)
Dept: OBGYN | Facility: CLINIC | Age: 26
End: 2021-05-14
Payer: COMMERCIAL

## 2021-05-14 VITALS
DIASTOLIC BLOOD PRESSURE: 67 MMHG | WEIGHT: 166.4 LBS | HEART RATE: 93 BPM | OXYGEN SATURATION: 96 % | SYSTOLIC BLOOD PRESSURE: 103 MMHG | BODY MASS INDEX: 27.72 KG/M2

## 2021-05-14 DIAGNOSIS — O09.293 HISTORY OF MISCARRIAGE, CURRENTLY PREGNANT, THIRD TRIMESTER: Primary | ICD-10-CM

## 2021-05-14 PROCEDURE — 99207 PR PRENATAL VISIT: CPT | Performed by: OBSTETRICS & GYNECOLOGY

## 2021-05-14 NOTE — PROGRESS NOTES
32w1d.    Tired.  No HA, visual changes, N/V   She has received the second dose of the COVID vaccine.   She has her baby shower next Saturday.   RTC 2 wk  Bora Jeter MD

## 2021-05-17 ENCOUNTER — TRANSFERRED RECORDS (OUTPATIENT)
Dept: HEALTH INFORMATION MANAGEMENT | Facility: CLINIC | Age: 26
End: 2021-05-17

## 2021-05-24 ENCOUNTER — TRANSFERRED RECORDS (OUTPATIENT)
Dept: HEALTH INFORMATION MANAGEMENT | Facility: CLINIC | Age: 26
End: 2021-05-24

## 2021-06-01 ENCOUNTER — TRANSFERRED RECORDS (OUTPATIENT)
Dept: HEALTH INFORMATION MANAGEMENT | Facility: CLINIC | Age: 26
End: 2021-06-01

## 2021-06-02 ENCOUNTER — PRENATAL OFFICE VISIT (OUTPATIENT)
Dept: OBGYN | Facility: CLINIC | Age: 26
End: 2021-06-02
Payer: COMMERCIAL

## 2021-06-02 VITALS
WEIGHT: 170.6 LBS | BODY MASS INDEX: 28.42 KG/M2 | DIASTOLIC BLOOD PRESSURE: 68 MMHG | HEART RATE: 98 BPM | OXYGEN SATURATION: 95 % | SYSTOLIC BLOOD PRESSURE: 99 MMHG

## 2021-06-02 DIAGNOSIS — Z36.85 ENCOUNTER FOR ANTENATAL SCREENING FOR STREPTOCOCCUS B: ICD-10-CM

## 2021-06-02 DIAGNOSIS — O09.292 PREGNANCY WITH HISTORY OF MISCARRIAGE, SECOND TRIMESTER: ICD-10-CM

## 2021-06-02 DIAGNOSIS — O09.293 HISTORY OF MISCARRIAGE, CURRENTLY PREGNANT, THIRD TRIMESTER: Primary | ICD-10-CM

## 2021-06-02 DIAGNOSIS — O26.899 PREGNANCY COMPLICATED BY UMBILICAL CORD VARIX IN ANTEPARTUM PERIOD: ICD-10-CM

## 2021-06-02 PROCEDURE — 87653 STREP B DNA AMP PROBE: CPT | Performed by: OBSTETRICS & GYNECOLOGY

## 2021-06-02 PROCEDURE — 99207 PR PRENATAL VISIT: CPT | Performed by: OBSTETRICS & GYNECOLOGY

## 2021-06-02 NOTE — PROGRESS NOTES
34w6d    No HA, visual changes, N/V   Labor plan and warning s/s discussed.   GBS testing today  Plan for induction at 36-37 weeks.  She would like to be closer to 37 weeks.  Induction process discussed.   RTC 1 wk    Bora Jeter MD

## 2021-06-03 LAB
GP B STREP DNA SPEC QL NAA+PROBE: NEGATIVE
SPECIMEN SOURCE: NORMAL

## 2021-06-08 ENCOUNTER — TRANSFERRED RECORDS (OUTPATIENT)
Dept: HEALTH INFORMATION MANAGEMENT | Facility: CLINIC | Age: 26
End: 2021-06-08

## 2021-06-09 ENCOUNTER — PRENATAL OFFICE VISIT (OUTPATIENT)
Dept: OBGYN | Facility: CLINIC | Age: 26
End: 2021-06-09
Payer: COMMERCIAL

## 2021-06-09 VITALS
BODY MASS INDEX: 28.72 KG/M2 | HEART RATE: 92 BPM | SYSTOLIC BLOOD PRESSURE: 111 MMHG | DIASTOLIC BLOOD PRESSURE: 72 MMHG | OXYGEN SATURATION: 95 % | WEIGHT: 172.4 LBS

## 2021-06-09 DIAGNOSIS — Z23 NEED FOR DIPHTHERIA-TETANUS-PERTUSSIS (TDAP) VACCINE: ICD-10-CM

## 2021-06-09 DIAGNOSIS — O09.293 HISTORY OF MISCARRIAGE, CURRENTLY PREGNANT, THIRD TRIMESTER: Primary | ICD-10-CM

## 2021-06-09 PROCEDURE — 90715 TDAP VACCINE 7 YRS/> IM: CPT | Performed by: OBSTETRICS & GYNECOLOGY

## 2021-06-09 PROCEDURE — 90471 IMMUNIZATION ADMIN: CPT | Performed by: OBSTETRICS & GYNECOLOGY

## 2021-06-09 PROCEDURE — 99207 PR PRENATAL VISIT: CPT | Performed by: OBSTETRICS & GYNECOLOGY

## 2021-06-09 NOTE — PROGRESS NOTES
Tdap (Adacel,Boostrix)    Date Status Dose VIS Date Route Site  Lot# Given By Verified By   6/9/2021 Given 0.5 mL 04/01/2020, Given Today IM RIGHT ARM [employer] [id number] June Jordan CMA --   Exp. Date NDC # Product Time Location External Comment   11/6/2022 [id number] Adacel  4:18 PM  --    Updated by: June Jordan CMA Updated on: 6/9/2021  4:24 PM

## 2021-06-09 NOTE — PROGRESS NOTES
35w6d    No HA, visual changes, N/V   Labor plan and warning s/s discussed.   She will see MFM on 06/15 and then see me later in the day.   Cervix is favorable at 4 cm.  Induction was recommended due to the umbilical vein Varix.  Medical induction is scheduled for 06/16/2021.    RTC 1 wk  Bora Jeter MD

## 2021-06-09 NOTE — NURSING NOTE
Prior to immunization administration, verified patients identity using patient s name and date of birth. Please see Immunization Activity for additional information.     Screening Questionnaire for Adult Immunization    Are you sick today?   No   Do you have allergies to medications, food, a vaccine component or latex?   No   Have you ever had a serious reaction after receiving a vaccination?   No   Do you have a long-term health problem with heart, lung, kidney, or metabolic disease (e.g., diabetes), asthma, a blood disorder, no spleen, complement component deficiency, a cochlear implant, or a spinal fluid leak?  Are you on long-term aspirin therapy?   No   Do you have cancer, leukemia, HIV/AIDS, or any other immune system problem?   No   Do you have a parent, brother, or sister with an immune system problem?   No   In the past 3 months, have you taken medications that affect  your immune system, such as prednisone, other steroids, or anticancer drugs; drugs for the treatment of rheumatoid arthritis, Crohn s disease, or psoriasis; or have you had radiation treatments?   No   Have you had a seizure, or a brain or other nervous system problem?   No   During the past year, have you received a transfusion of blood or blood    products, or been given immune (gamma) globulin or antiviral drug?   No   For women: Are you pregnant or is there a chance you could become       pregnant during the next month?   Yes   Have you received any vaccinations in the past 4 weeks?   No     Immunization questionnaire was positive for at least one answer.  Notified [facility].        Per orders of Dr. Jeter, injection of Tdap given by June Jordan CMA. Patient instructed to remain in clinic for 15 minutes afterwards, and to report any adverse reaction to me immediately.       Screening performed by June Jordan CMA on 6/9/2021 at 4:25 PM.

## 2021-06-15 ENCOUNTER — TRANSFERRED RECORDS (OUTPATIENT)
Dept: HEALTH INFORMATION MANAGEMENT | Facility: CLINIC | Age: 26
End: 2021-06-15

## 2021-06-15 ENCOUNTER — NURSE TRIAGE (OUTPATIENT)
Dept: NURSING | Facility: CLINIC | Age: 26
End: 2021-06-15

## 2021-06-15 ENCOUNTER — PRENATAL OFFICE VISIT (OUTPATIENT)
Dept: OBGYN | Facility: CLINIC | Age: 26
End: 2021-06-15
Payer: COMMERCIAL

## 2021-06-15 VITALS
SYSTOLIC BLOOD PRESSURE: 117 MMHG | WEIGHT: 171.6 LBS | HEART RATE: 77 BPM | OXYGEN SATURATION: 96 % | BODY MASS INDEX: 28.59 KG/M2 | DIASTOLIC BLOOD PRESSURE: 72 MMHG

## 2021-06-15 DIAGNOSIS — O09.291 PREGNANCY WITH HISTORY OF MISCARRIAGE, FIRST TRIMESTER: Primary | ICD-10-CM

## 2021-06-15 PROCEDURE — 99207 PR PRENATAL VISIT: CPT | Performed by: OBSTETRICS & GYNECOLOGY

## 2021-06-15 NOTE — PROGRESS NOTES
36w5d    No HA, visual changes, N/V etc.    Labor plan and warning s/s discussed.   Beta strep negative.   Cervix not as dilated as last week, but likely due to seeing her later in the day then and earlier today.   Induction for tomorrow for umbilical vein Varix.   Bora Jeter MD

## 2021-06-15 NOTE — TELEPHONE ENCOUNTER
RN called pt, currently 36w5d, scheduled for induction tomorrow morning. Pt did have cervix check today.    Pt denies increase in discharge, LOF, decreased fetal movement, abdominal pain, or contractions.    Pt stats she had brown tinged discharge following use of bathroom this afternoon. Pt unaware if she has lost her mucus plug yet.    RN advised brown tinged discharge likely from cervix check or could be start of loss of mucus plug.     RN advised when to be seen in L&D sooner for bright red bleeding, consistent contractions, abdominal pain, or decreased fetal movement.    Patient verbalized understanding and agreed to plan.     Yolis Zapata RN on 6/15/2021 at 3:09 PM

## 2021-06-15 NOTE — TELEPHONE ENCOUNTER
[facility]: Nurse Triage Note  SITUATION/BACKGROUND                                                      Shirley Matthew is a 25 year old female who calls with bloody discharge following OB appointment today.      Allergies: No Known Allergies    ASSESSMENT      25 year old female 36w5d calls because she has noted some bloody(brownish)colored discharge since her OB appointment today with Dr. Jeter   She states it is contained to her underwear- She denies severe abdominal pain or contractions. Feeling fetal movement  No other new symptoms   She is scheduled to be induced tomorrow        Will forward info to OB nurse to call back with recommendations       RECOMMENDATION/PLAN                                                      RECOMMENDED DISPOSITION:  See above  Will comply with recommendation: Yes    If further questions/concerns or if symptoms do not improve, worsen or new symptoms develop, call your PCP or [phone] to talk with the Resident on call, as soon as possible.    Guideline used: preg,vaginal bleeding  Telephone Triage Protocols for Nurses, Fifth Edition, Penelope Galindo, RN, RN

## 2021-07-27 ENCOUNTER — PRENATAL OFFICE VISIT (OUTPATIENT)
Dept: OBGYN | Facility: CLINIC | Age: 26
End: 2021-07-27
Payer: COMMERCIAL

## 2021-07-27 VITALS
OXYGEN SATURATION: 96 % | WEIGHT: 148 LBS | HEART RATE: 77 BPM | BODY MASS INDEX: 24.66 KG/M2 | DIASTOLIC BLOOD PRESSURE: 79 MMHG | SYSTOLIC BLOOD PRESSURE: 113 MMHG

## 2021-07-27 PROCEDURE — 99207 PR POST PARTUM EXAM: CPT | Performed by: OBSTETRICS & GYNECOLOGY

## 2021-07-27 ASSESSMENT — PATIENT HEALTH QUESTIONNAIRE - PHQ9: SUM OF ALL RESPONSES TO PHQ QUESTIONS 1-9: 3

## 2021-07-27 NOTE — PROGRESS NOTES
POSTPARTUM VISIT:    Delivery Information:    Date:  06/16/2021  Route:  Vaginal delivery   Sex:  male    Weight:  3760 g      Apgars:  8/9  Reviewed pregnancy and birth.  Doing well.  No significant symptoms.  Infant doing fine.  Breast feeding:  yes  Bottle:  yes  Formula:  yes    Exam:  /79 (BP Location: Right arm, Cuff Size: Adult Regular)   Pulse 77   Wt 67.1 kg (148 lb)   LMP 10/01/2020   SpO2 96%   BMI 24.66 kg/m    PHQ-9 = 3   General:  WNWD female, NAD  Alert  Oriented x 3  Gait:  Normal  Skin:  Normal skin turgor  HEENT:  NC/AT, EOMI  Abdomen:  Non-tender, non-distended.  Vulva: No external lesions, normal hair distribution, no adenopathy  BUS:  Normal, no masses noted  Vagina: Moist, pink, no abnormal discharge, well rugated, no lesions  Cervix: Smooth, pink, no visible lesions  Uterus: Normal size, anteverted, non-tender, mobile  Ovaries: No mass, non-tender, mobile  Rectal exam:  Good sphincter tone.     Reviewed contraception plans.  We reviewed the options available, the side effects, risks, benefits and instructions on proper use.  She will think about the options and she will let me know.   Continue general medical care.

## 2021-10-24 ENCOUNTER — HEALTH MAINTENANCE LETTER (OUTPATIENT)
Age: 26
End: 2021-10-24

## 2021-11-30 DIAGNOSIS — F41.9 ANXIETY: ICD-10-CM

## 2021-11-30 RX ORDER — SERTRALINE HYDROCHLORIDE 100 MG/1
100 TABLET, FILM COATED ORAL DAILY
Qty: 90 TABLET | Refills: 1 | Status: SHIPPED | OUTPATIENT
Start: 2021-11-30 | End: 2023-09-19

## 2021-11-30 NOTE — TELEPHONE ENCOUNTER
Date last filled 7/29/21  Quantity 90    Last seen 7/27/21 post partum  No future appointment scheduled    VIVI Cornejo 11/30/2021

## 2021-11-30 NOTE — TELEPHONE ENCOUNTER
"Requested Prescriptions   Pending Prescriptions Disp Refills     sertraline (ZOLOFT) 100 MG tablet 90 tablet 2     Sig: Take 1 tablet (100 mg) by mouth daily       SSRIs Protocol Passed - 11/30/2021 10:55 AM        Passed - Recent (12 mo) or future (30 days) visit within the authorizing provider's specialty     Patient has had an office visit with the authorizing provider or a provider within the authorizing providers department within the previous 12 mos or has a future within next 30 days. See \"Patient Info\" tab in inbasket, or \"Choose Columns\" in Meds & Orders section of the refill encounter.              Passed - Medication is active on med list        Passed - Patient is age 18 or older        Passed - No active pregnancy on record        Passed - No positive pregnancy test in last 12 months           Prescription approved per [facility] Refill Protocol.    Birgti Rowley RN    "

## 2022-01-06 ENCOUNTER — MEDICAL CORRESPONDENCE (OUTPATIENT)
Dept: HEALTH INFORMATION MANAGEMENT | Facility: CLINIC | Age: 27
End: 2022-01-06
Payer: COMMERCIAL

## 2022-10-15 ENCOUNTER — HEALTH MAINTENANCE LETTER (OUTPATIENT)
Age: 27
End: 2022-10-15

## 2023-06-26 LAB
ABO/RH(D): NORMAL
ANTIBODY SCREEN: NEGATIVE
SPECIMEN EXPIRATION DATE: NORMAL

## 2023-06-27 ENCOUNTER — ANCILLARY PROCEDURE (OUTPATIENT)
Dept: ULTRASOUND IMAGING | Facility: CLINIC | Age: 28
End: 2023-06-27
Attending: OBSTETRICS & GYNECOLOGY
Payer: COMMERCIAL

## 2023-06-27 ENCOUNTER — PRENATAL OFFICE VISIT (OUTPATIENT)
Dept: OBGYN | Facility: CLINIC | Age: 28
End: 2023-06-27
Payer: COMMERCIAL

## 2023-06-27 VITALS
WEIGHT: 120.4 LBS | HEART RATE: 96 BPM | SYSTOLIC BLOOD PRESSURE: 105 MMHG | OXYGEN SATURATION: 99 % | BODY MASS INDEX: 20.06 KG/M2 | DIASTOLIC BLOOD PRESSURE: 71 MMHG

## 2023-06-27 DIAGNOSIS — O09.291 PREGNANCY WITH HISTORY OF MISCARRIAGE, FIRST TRIMESTER: ICD-10-CM

## 2023-06-27 DIAGNOSIS — O09.291 PREGNANCY WITH HISTORY OF MISCARRIAGE, FIRST TRIMESTER: Primary | ICD-10-CM

## 2023-06-27 LAB
ALBUMIN UR-MCNC: NEGATIVE MG/DL
APPEARANCE UR: CLEAR
BACTERIA #/AREA URNS HPF: ABNORMAL /HPF
BASOPHILS # BLD AUTO: 0 10E3/UL (ref 0–0.2)
BASOPHILS NFR BLD AUTO: 0 %
BILIRUB UR QL STRIP: NEGATIVE
COLOR UR AUTO: YELLOW
EOSINOPHIL # BLD AUTO: 0.1 10E3/UL (ref 0–0.7)
EOSINOPHIL NFR BLD AUTO: 1 %
ERYTHROCYTE [DISTWIDTH] IN BLOOD BY AUTOMATED COUNT: 11.6 % (ref 10–15)
GLUCOSE UR STRIP-MCNC: NEGATIVE MG/DL
HCT VFR BLD AUTO: 37.9 % (ref 35–47)
HGB BLD-MCNC: 13.1 G/DL (ref 11.7–15.7)
HGB UR QL STRIP: ABNORMAL
IMM GRANULOCYTES # BLD: 0 10E3/UL
IMM GRANULOCYTES NFR BLD: 0 %
KETONES UR STRIP-MCNC: NEGATIVE MG/DL
LEUKOCYTE ESTERASE UR QL STRIP: ABNORMAL
LYMPHOCYTES # BLD AUTO: 0.9 10E3/UL (ref 0.8–5.3)
LYMPHOCYTES NFR BLD AUTO: 15 %
MCH RBC QN AUTO: 31.3 PG (ref 26.5–33)
MCHC RBC AUTO-ENTMCNC: 34.6 G/DL (ref 31.5–36.5)
MCV RBC AUTO: 91 FL (ref 78–100)
MONOCYTES # BLD AUTO: 0.5 10E3/UL (ref 0–1.3)
MONOCYTES NFR BLD AUTO: 8 %
MUCOUS THREADS #/AREA URNS LPF: PRESENT /LPF
NEUTROPHILS # BLD AUTO: 4.8 10E3/UL (ref 1.6–8.3)
NEUTROPHILS NFR BLD AUTO: 76 %
NITRATE UR QL: NEGATIVE
PH UR STRIP: 5.5 [PH] (ref 5–7)
PLATELET # BLD AUTO: 194 10E3/UL (ref 150–450)
RBC # BLD AUTO: 4.19 10E6/UL (ref 3.8–5.2)
RBC #/AREA URNS AUTO: ABNORMAL /HPF
RUBV IGG SERPL QL IA: 1.46 INDEX
RUBV IGG SERPL QL IA: POSITIVE
SP GR UR STRIP: 1.01 (ref 1–1.03)
T PALLIDUM AB SER QL: NONREACTIVE
UROBILINOGEN UR STRIP-ACNC: 0.2 E.U./DL
WBC # BLD AUTO: 6.3 10E3/UL (ref 4–11)
WBC #/AREA URNS AUTO: ABNORMAL /HPF

## 2023-06-27 PROCEDURE — 87340 HEPATITIS B SURFACE AG IA: CPT | Performed by: OBSTETRICS & GYNECOLOGY

## 2023-06-27 PROCEDURE — 87086 URINE CULTURE/COLONY COUNT: CPT | Performed by: OBSTETRICS & GYNECOLOGY

## 2023-06-27 PROCEDURE — 99213 OFFICE O/P EST LOW 20 MIN: CPT | Performed by: OBSTETRICS & GYNECOLOGY

## 2023-06-27 PROCEDURE — 86780 TREPONEMA PALLIDUM: CPT | Performed by: OBSTETRICS & GYNECOLOGY

## 2023-06-27 PROCEDURE — 76801 OB US < 14 WKS SINGLE FETUS: CPT | Mod: TC | Performed by: RADIOLOGY

## 2023-06-27 PROCEDURE — 86850 RBC ANTIBODY SCREEN: CPT | Performed by: OBSTETRICS & GYNECOLOGY

## 2023-06-27 PROCEDURE — 86762 RUBELLA ANTIBODY: CPT | Performed by: OBSTETRICS & GYNECOLOGY

## 2023-06-27 PROCEDURE — 86901 BLOOD TYPING SEROLOGIC RH(D): CPT | Performed by: OBSTETRICS & GYNECOLOGY

## 2023-06-27 PROCEDURE — 86900 BLOOD TYPING SEROLOGIC ABO: CPT | Performed by: OBSTETRICS & GYNECOLOGY

## 2023-06-27 PROCEDURE — 85025 COMPLETE CBC W/AUTO DIFF WBC: CPT | Performed by: OBSTETRICS & GYNECOLOGY

## 2023-06-27 PROCEDURE — 87389 HIV-1 AG W/HIV-1&-2 AB AG IA: CPT | Performed by: OBSTETRICS & GYNECOLOGY

## 2023-06-27 PROCEDURE — 81001 URINALYSIS AUTO W/SCOPE: CPT | Performed by: OBSTETRICS & GYNECOLOGY

## 2023-06-27 PROCEDURE — 36415 COLL VENOUS BLD VENIPUNCTURE: CPT | Performed by: OBSTETRICS & GYNECOLOGY

## 2023-06-27 PROCEDURE — 76817 TRANSVAGINAL US OBSTETRIC: CPT | Mod: TC | Performed by: RADIOLOGY

## 2023-06-27 NOTE — PROGRESS NOTES
INITIAL OB ASSESSMENT............................................Date: 2023                            ---------------------    Name: Shirley Menchaca.......................................................................Plan Number: [id number]    Age: 27 year old   : 1995  Phone: [phone] (home)   Address: [address] [place] [address]    Marital Status:  Single, co-habitation,   Race/Ethnicity:   Occupation:  Stay at home mother   Partner's Name:  Alex Stanley, Partner's Occupation:  Landscaping     OB Physician: Bora Jeter MD       LMP:  Patient's LMP from OB Dating Form:  Patient's last menstrual period was 2023.  Regular menses? yes  Menses every month.   Length of menses: 5 days    Obstetrical History  ===================  OB History    Para Term  AB Living   3 1 0 1 1 1   SAB IAB Ectopic Multiple Live Births   1 0 0 0 1      # Outcome Date GA Lbr Ruperto/2nd Weight Sex Delivery Anes PTL Lv   3 Current            2  21 36w6d 04:37 / 03:13 3.76 kg (8 lb 4.6 oz) M IVD EPI N LITZY      Birth Comments: 4th degree laceration      Complications: Umbilical cord complication      Name: STEPHY MENCHACA      Apgar1: 8  Apgar5: 9   1 SAB                Past Medical History:  Past Medical History:   Diagnosis Date     Alopecia      Anxiety      Depression with anxiety      Exercise-induced asthma      Exertional asthma     as a teenager     Family history of malignant neoplasm of breast     mother, recurrent with mets, stage IV     Migraine with aura and without status migrainosus, not intractable        Past Surgical History:  Past Surgical History:   Procedure Laterality Date     NO HISTORY OF SURGERY         Current Outpatient Medications   Medication Sig Dispense Refill     Prenatal Vit-Fe Fumarate-FA (PRENATAL MULTIVITAMIN W/IRON) 27-0.8 MG tablet Take 1 tablet by mouth daily       sertraline (ZOLOFT) 100 MG  tablet Take 1 tablet (100 mg) by mouth daily (Patient not taking: Reported on 6/27/2023) 90 tablet 1       No Known Allergies    Social History     Socioeconomic History     Marital status: Single     Spouse name: Not on file     Number of children: Not on file     Years of education: Not on file     Highest education level: Not on file   Occupational History     Not on file   Tobacco Use     Smoking status: Never     Smokeless tobacco: Never   Substance and Sexual Activity     Alcohol use: No     Drug use: No     Sexual activity: Yes     Partners: Male     Birth control/protection: Spermicide   Other Topics Concern     Parent/sibling w/ CABG, MI or angioplasty before 65F 55M? Not Asked   Social History Narrative     Not on file     Social Determinants of Health     Financial Resource Strain: Not on file   Food Insecurity: Not on file   Transportation Needs: Not on file   Physical Activity: Not on file   Stress: Not on file   Social Connections: Not on file   Intimate Partner Violence: Not on file   Housing Stability: Not on file     Single, Children, Significant Other  No substance abuse, environmental exposures, mental health risks and No financial concerns.  They have pets, 2 indoor cats.  Partner support.       Family History   Problem Relation Age of Onset     Colon Cancer Father 52     Alcoholism Father      Anxiety Disorder Sister      Anxiety Disorder Brother      Alcoholism Brother      Thyroid Disease Maternal Grandmother      Thyroid Disease Paternal Grandmother      Anxiety Disorder Sister      Alcoholism Mother      Suicide Cousin      Schizophrenia Cousin        Past Medical History of Father of Baby:   No significant medical history     A maternal aunt had a child with spina bifida.  A cousin has mental retardation.   No known genetic diseases in the FOB's 1st or 2nd degree relatives      REVIEW OF SYMPTOMS:   History Since Last Menstrual Period:    nausea, fatigue and breast tenderness       PHYSICAL  EXAM:  /71 (BP Location: Right arm, Cuff Size: Adult Regular)   Pulse 96   Wt 54.6 kg (120 lb 6.4 oz)   LMP 2023   SpO2 99%   BMI 20.06 kg/m    General:  WNWD female, NAD  Oriented:  X 3  Alert  PSYCH:  mentation appears normal., affect and mood normal  HEENT:  NC/AT, EOMI  NECK:  Neck supple. No adenopathy. Thyroid symmetric, normal size,, Carotids without bruits.  HEART:  RRR  LUNGS:  Clear to auscultation.  Good respiratory effort  BREASTS: Symmetrical, no masses palpated, no discharge expressed.   ABDOMEN: Benign, Soft, flat, non-tender, No masses, organomegaly, No inguinal nodes and Bowel sounds normoactive FHT's not heard   PELVIC EXAM:  Declined   EXTREMITIES:No cyanosis, clubbing, warm and well perfused and No edema   GAIT: normal including tandem walk, heel and toe walk.        Assessment:   IUP at 8.6 weeks gestation.   Pregnancy with history of miscarriage        Plan:  Options for  testing for chromosomal and birth defects were discussed with the patient.  Diagnostic tests include CVS and Amniocentesis.  We discussed that these tests are definitive but invasive and do carry a risk of fetal loss.   Screening tests include nuchal translucency/blood marker testing in the first trimester and quad screening in the second trimester.  We discussed that these are screening tests and not diagnostic tests and that false positives and negatives are a distinct possibility.   She will talk with her partner and decide.   We discussed physician coverage, tertiary support, diet, exercise, weight gain, schedule of visits, routine and indicated ultrasounds, and childbirth education.   Low-dose aspirin prophylaxis can be beneficial in women at high risk of developing preeclampsia.  I generally recommend we begin aspirin at about 12-13 weeks gestation and continue until at least 36 weeks.  Women with at least one of the following conditions are considered high risk for developing preeclampsia:  Previous pregnancy with preeclampsia,  multifetal-gestation, chronic hypertension, diabetes mellitus, chronic kidney disease, autoimmune disease.  Women with more than 1 of the following conditions may also consider low-dose aspirin prophylaxis in pregnancy: Nulliparity, BMI greater than 30, family history of preeclampsia (mother or sister), AMA, socio-demographic characteristics, personal risk factors.    At this time she does not fall into the risks factors.  She may take the ASA 81 mg if she desires or if she finds she has risk factors noted.    Labs done today  RTC 4 weeks    Bora Jeter MD

## 2023-06-27 NOTE — PATIENT INSTRUCTIONS
If you have any questions regarding your visit, Please contact your care team.    To Schedule an Appointment 24/7  Call: [phone]  Women s Health  TELEPHONE NUMBER   Bora Jeter M.D.    June-Medical Assistant    Rupali Abraham-Surgery Scheduler  Laurie-Surgery Scheduler Tuesday-[place]                   7:30 a.m.-3:30 p.m.  Wednesday-[place]             8:00 a.m.-4:30 p.m.  Thursday-[place]     8:00 a.m.-4:00 p.m.  Friday-[place]                       7:30 a.m.-1:00 p.m. [facility]  [id number] [place], [place] [address]e[facility] HealthGILLIAN Jiménez [id number] [phone] Fax    Imaging Scheduling all locations  [phone]      [facility]  [address]  [place], [place] [address] [phone]    [facility]  [address]  [place] [id number] [phone] ask for Women's Clinic     **Surgeries** Our Surgery Schedulers will contact you to schedule. If you do not receive a call within 3 business days, please call [phone].    Urgent Care locations:  [facility] Monday-Friday                 10 am - 8 pm  Saturday and Sunday        9 am - 5 pm   [phone]   If you need a medication refill, please contact your pharmacy. Please allow 3 business days for your refill to be completed.  As always, Thank you for trusting us with your healthcare needs!  If you have any questions regarding your visit, Please contact your care team.    [facility] [facility]: [phone]    To Schedule an Appointment 24/7  Call: [phone]    see additional instructions from your care team below      Healthy Eating Habits During Pregnancy  It s important to develop healthy eating habits while you are pregnant, for you as well as for your baby. Here are some ways to stay healthy.   Aim for a healthy weight  A slow, steady rate of weight gain is often best. After the  first trimester, you may gain about a pound a week. If you were overweight before pregnancy, you need to gain fewer pounds. Your healthcare provider can give you a healthy weight goal for your pregnancy.   Don t diet  Now is not the time to diet. You may not get enough of the nutrients you and your baby need. Instead, learn how to be a healthy eater. Start by doing it for your baby. Soon, you may do it for yourself.   Vitamins and supplements  Talk with your healthcare provider about taking these and other prenatal vitamins and supplements.   Iron makes the extra blood you need now.  Calcium and vitamin D help build and keep strong bones.  Folic acid helps prevent certain birth defects.  Iodine helps the thyroid work right.  Some vitamins may not be safe to take. Your healthcare provider will tell you which ones to avoid.  Fluids    Drink at least 8 to 10 cups of fluid daily. Your baby needs fluids. Fluids also decrease constipation, flush out toxins and waste, limit swelling, and help prevent bladder infections. Water is best. Other good choices are:   Water or seltzer water with a slice of lemon or lime. (These can also help ease an upset stomach.)  Clear soups that are low in salt  Low-fat or fat-free milk, soy or rice milk with calcium added  Popsicles or gelatin  Things to avoid  Some things might harm your growing baby. Don t eat or drink:   Alcohol  Unpasteurized dairy foods and juices  Raw or undercooked meat, poultry, fish, or eggs  Unwashed fruits and vegetables  Prepared meats, like deli meats or hot dogs, unless heated until steaming hot  Fish that are high in mercury, like shark, swordfish, argentina mackerel, tilefish, and albacore tuna  Things to limit  Ask your healthcare provider whether it s safe to eat or drink:   Caffeine  Artificial sweeteners  Organ meats  Certain types of fish  Fish and shellfish that contain mercury in lower amounts, like shrimp, canned light tuna, salmon, pollock, and  catfish  [employer]Well last reviewed this educational content on 7/1/2021 2000-2022 [employer]. All rights reserved. This information is not intended as a substitute for professional medical care. Always follow your healthcare professional's instructions.          Pregnancy: Planning Your Exercise Routine  While you re pregnant, an exercise routine helps both your mind and your body feel good. It tones your muscles and makes them stronger. It also gives you and your baby more oxygen.   The right exercise for you    Overall conditioning is best for you and your baby. Try walking, swimming, or riding a stationary bike. Always warm up, cool down, and drink enough fluids. Keep a snack close by in case your blood sugar gets low. Discuss exercise choices with your healthcare provider. Talk about the following:   If you already exercise, find out how to adapt your routine while you re pregnant. Keep the intensity of the exercise moderate. As your pregnancy progresses, your center of gravity will change. Be careful to keep your balance.  Ask if there are any local prenatal exercise classes, such as yoga or water aerobics. Find out which prenatal exercise videos are good choices.  If you were not exercising before your pregnancy, find out the best way to start. Now is not the time to begin a new workout on your own. Start slowly. Listen to your body.  Ask which forms of exercise you should avoid. These may include risky activities like hot yoga, horseback riding, scuba diving, skiing, skating, and contact sports.  Pelvic tilts  These help strengthen your stomach muscles and low back. You can do pelvic tilts instead of sit-ups.   Do this exercise on your hands and knees.  Relax the back of your neck. Pull your stomach in until your low back flattens.  Hold for 30 seconds. Release. Repeat 10 times. Do this twice a day.  Kegel exercises  Kegel exercises strengthen the pelvic muscles. Doing Kegels daily helps  prepare these muscles for delivery. Kegels also help ease your recovery. You exercise these muscles by tightening, holding, then relaxing them. To do 1 type of Kegel exercise, contract as if you were stopping your urine stream (but do it when you re not urinating). Hold for 10 seconds, then repeat 10 times, a few times a day.   Tips to add activity  Here are some tips to follow:  Park the car farther from a store and walk.  If you can, do errands on foot instead of driving.  Walk across the office to talk to someone in person instead of calling.  While waiting for appointments, go up and down stairs or around the block.  Tips to stay active  Here are some tips to follow:  Maintain your routine. But exercise less intensely if you feel tired.  Base your workout on how you feel, not your heart rate. Heart rates aren t a good way to measure effort during pregnancy.  Don't exercise on your back after week 16.  What are the warning signs that I should stop exercising?  Stop exercising and call your healthcare provider if you have any of these symptoms:  Vaginal bleeding   Dizziness or feeling faint   Increased shortness of breath   Chest pain   Headache   Muscle weakness   Calf (back of the leg) pain or swelling    Uterine contractions or  labor   Decreased fetal movement   Fluid leaking (or gushing) from your vagina  [employer] last reviewed this educational content on 2021-2022 [employer]. All rights reserved. This information is not intended as a substitute for professional medical care. Always follow your healthcare professional's instructions.          Nutrition During Pregnancy   Having a healthy baby depends mostly on you. What you eat matters to your baby and your health. During pregnancy, you will likely need about 300 more calories per day than before you became pregnant. Each day, try to eat the number of servings listed here for each food group. In addition, cut down on salt and  caffeine. Limit the amount of sweets and high-fat foods you eat. Don t smoke or drink alcohol.     Important: See your healthcare provider as often as requested. If you have any questions, be sure to ask them.   Fruits Vegetables Grains & Cereals* Fats & Oils   2 cups  Examples of 1-cup servings:   1 medium apple  1 medium orange  1 medium banana  1 cup chopped fruit  1 cup 100% fruit juice (pasteurized)   1/2 cup dried fruit 2-1/2 to 3 cups   Examples of 1 servin cups raw, leafy greens   1 cup raw or cooked cut-up vegetables   1 cup 100% vegetable juice (pasteurized)  6 to 8 ounces  Examples of 1-ounce servings:   1 slice bread  1/2 cup cooked rice  1/2 cup cooked cereal   1/2 cup pasta  1 ounce cold cereal 6 to 8 teaspoons   Dairy** Protein--- Fluids      3 cups  Examples of 1-cup servings:   1 cup milk  1 cup yogurt  1-1/2 ounces natural cheese   2 ounces processed cheese  5 to 6-1/2 ounces  Examples of 1-ounce servings:   1 egg  1 ounce of lean meat, poultry, or fish   1/4 cup cooked beans   1 tablespoon peanut butter   1/2 ounce nuts 8 or more 8-ounce glasses   Examples:  Water  Mineral water  Clear soups, broth      *Note: Choose whole grains whenever possible.   ** Note: Try to choose low-fat options; stay away from soft cheeses and unpasteurized milk.   --- Notes: Don't eat raw or undercooked meats, eggs, seafood, fish, and shellfish. Also, some types of fish, such as shark, swordfish, and argentina mackerel, should not be eaten during pregnancy. Don't eat hot dogs, lunch meats, or cold cuts unless heated to steaming just before being served. Ask your healthcare provider about safe choices.   Prenatal supplements  A prenatal supplement is a pill that you take daily during pregnancy. It helps make sure you re getting the right amount of certain nutrients that are important to your baby. Ask your healthcare provider to help you choose the best one for you. Important nutrients during pregnancy include:   Folic  acid. It's best to start taking this supplement 1 month before you start trying to get pregnant. Folic acid helps prevent certain problems in your baby. During pregnancy, you need to take 400 micrograms (mcg) of folic acid every day for the first 2 to 3 months after conception. After that, 600 mcg is needed for a growing baby and placenta.  Iron, calcium, and vitamin D. You may also be advised to take these supplements during pregnancy. They help keep you and your baby healthy. Take them at different times because calcium makes it hard for the body to absorb iron. Taking iron with orange juice helps to increase its absorption.  [employer] last reviewed this educational content on 8/1/2020 2000-2022 [employer]. All rights reserved. This information is not intended as a substitute for professional medical care. Always follow your healthcare professional's instructions.          Pregnancy: Your Weight  Being a healthy weight is important for both you and your baby. The weight you gain now is not just extra fat. It is also the weight of your baby. And it is the increased blood and fluids to support the baby. A slow, steady rate of gain is best. How much you should gain depends on your weight before getting pregnant. Check with your healthcare provider to find out what is right for you.      Your weight will be checked regularly by your healthcare provider.     Talk to your healthcare provider if you have any questions.   If you gain too much  Gaining too much weight might cause you to feel tired, or you could have a harder pregnancy or birth. If you and your healthcare provider decide you re gaining too much:   Eat fewer fats and sugars. Instead, eat fruit, vegetables, and whole-grain foods.  Drink plenty of water between meals.  Get at least 20 minutes of light exercise, like walking, each day.  Don t diet. You might not get enough of the nutrients you and your baby need.  Keep a food diary to help you  gauge what and how much you are eating.  If you're not gaining enough  If you don t gain enough, your baby could be too small or have health problems. Women tend to gain most of their weight in the second and third trimesters. For now:   Eat many types of foods. Make sure you get enough calcium, protein, and carbohydrates.  Don t skip meals.  Eat healthy snacks.  Pick nutrient-dense, high-calorie healthy food like trail mix or protein shakes.  See a dietitian for help.  Talk to your healthcare provider if you have had an eating disorder or problems with certain foods.  The following are ways to get more calories:  Eat breakfast every day. Peanut butter or a slice of cheese on toast can give you an extra protein boost.  Snack between meals. Yogurt and dried fruits can provide protein, calcium, and minerals.  Try to eat more foods that are high in good fats, like nuts, fatty fish, avocados, and olive oil.  Drink juices made from real fruit that are high in vitamin C or beta-carotene, like grapefruit juice, orange juice, papaya nectar, apricot nectar, and carrot juice.  Don't eat junk food, like foods high in sugar.  [employer] last reviewed this educational content on 7/1/2021 2000-2022 [employer]. All rights reserved. This information is not intended as a substitute for professional medical care. Always follow your healthcare professional's instructions.

## 2023-06-28 LAB
HBV SURFACE AG SERPL QL IA: NONREACTIVE
HIV 1+2 AB+HIV1 P24 AG SERPL QL IA: NONREACTIVE

## 2023-06-29 LAB — BACTERIA UR CULT: NORMAL

## 2023-07-25 ENCOUNTER — PRENATAL OFFICE VISIT (OUTPATIENT)
Dept: OBGYN | Facility: CLINIC | Age: 28
End: 2023-07-25
Payer: COMMERCIAL

## 2023-07-25 VITALS
WEIGHT: 122.4 LBS | DIASTOLIC BLOOD PRESSURE: 64 MMHG | OXYGEN SATURATION: 97 % | HEART RATE: 69 BPM | SYSTOLIC BLOOD PRESSURE: 99 MMHG | BODY MASS INDEX: 20.39 KG/M2

## 2023-07-25 DIAGNOSIS — O09.292 CURRENT PREGNANCY IN SECOND TRIMESTER WITH HISTORY OF SPONTANEOUS ABORTION DURING PRIOR PREGNANCY: Primary | ICD-10-CM

## 2023-07-25 PROCEDURE — 99207 PR PRENATAL VISIT: CPT | Performed by: OBSTETRICS & GYNECOLOGY

## 2023-07-25 NOTE — PROGRESS NOTES
12w6d  Eating well.  Nausea is improved  She has had some constipation.  Stool softeners.    Discussed genetic screening.   The couple will discuss and decide.   RTC 4 weeks.   Bora Jeter MD

## 2023-08-22 ENCOUNTER — PRENATAL OFFICE VISIT (OUTPATIENT)
Dept: OBGYN | Facility: CLINIC | Age: 28
End: 2023-08-22
Payer: COMMERCIAL

## 2023-08-22 VITALS
WEIGHT: 126.8 LBS | DIASTOLIC BLOOD PRESSURE: 65 MMHG | BODY MASS INDEX: 21.13 KG/M2 | OXYGEN SATURATION: 97 % | SYSTOLIC BLOOD PRESSURE: 97 MMHG | HEART RATE: 92 BPM

## 2023-08-22 DIAGNOSIS — O09.292 CURRENT PREGNANCY IN SECOND TRIMESTER WITH HISTORY OF SPONTANEOUS ABORTION DURING PRIOR PREGNANCY: Primary | ICD-10-CM

## 2023-08-22 PROCEDURE — 99207 PR PRENATAL VISIT: CPT | Performed by: OBSTETRICS & GYNECOLOGY

## 2023-08-22 NOTE — PROGRESS NOTES
16w6d   Eating well.   Discussed genetic screening.  She will decide.   RTC 4 weeks.   Bora Jeter MD

## 2023-09-19 ENCOUNTER — PRENATAL OFFICE VISIT (OUTPATIENT)
Dept: OBGYN | Facility: CLINIC | Age: 28
End: 2023-09-19
Payer: COMMERCIAL

## 2023-09-19 ENCOUNTER — ANCILLARY PROCEDURE (OUTPATIENT)
Dept: ULTRASOUND IMAGING | Facility: CLINIC | Age: 28
End: 2023-09-19
Attending: OBSTETRICS & GYNECOLOGY
Payer: COMMERCIAL

## 2023-09-19 VITALS
OXYGEN SATURATION: 98 % | DIASTOLIC BLOOD PRESSURE: 56 MMHG | HEART RATE: 74 BPM | WEIGHT: 129.4 LBS | BODY MASS INDEX: 21.56 KG/M2 | SYSTOLIC BLOOD PRESSURE: 93 MMHG

## 2023-09-19 DIAGNOSIS — O09.292 CURRENT PREGNANCY IN SECOND TRIMESTER WITH HISTORY OF SPONTANEOUS ABORTION DURING PRIOR PREGNANCY: Primary | ICD-10-CM

## 2023-09-19 DIAGNOSIS — O09.292 CURRENT PREGNANCY IN SECOND TRIMESTER WITH HISTORY OF SPONTANEOUS ABORTION DURING PRIOR PREGNANCY: ICD-10-CM

## 2023-09-19 PROCEDURE — 76805 OB US >/= 14 WKS SNGL FETUS: CPT | Mod: TC | Performed by: RADIOLOGY

## 2023-09-19 PROCEDURE — 99207 PR PRENATAL VISIT: CPT | Performed by: OBSTETRICS & GYNECOLOGY

## 2023-09-19 NOTE — PROGRESS NOTES
20w6d.   Doing well without issues/concerns.    Preliminary ultrasound report reviewed.  Final is pending.  Labs next visit.   Bora Jeter MD

## 2023-09-29 DIAGNOSIS — O35.EXX0 FETAL RENAL ANOMALY, SINGLE GESTATION: ICD-10-CM

## 2023-09-29 DIAGNOSIS — O09.292 CURRENT PREGNANCY IN SECOND TRIMESTER WITH HISTORY OF SPONTANEOUS ABORTION DURING PRIOR PREGNANCY: Primary | ICD-10-CM

## 2023-10-18 ENCOUNTER — PRENATAL OFFICE VISIT (OUTPATIENT)
Dept: OBGYN | Facility: CLINIC | Age: 28
End: 2023-10-18
Payer: COMMERCIAL

## 2023-10-18 VITALS
DIASTOLIC BLOOD PRESSURE: 54 MMHG | HEART RATE: 74 BPM | BODY MASS INDEX: 22.69 KG/M2 | SYSTOLIC BLOOD PRESSURE: 93 MMHG | OXYGEN SATURATION: 98 % | WEIGHT: 136.2 LBS

## 2023-10-18 DIAGNOSIS — O09.292 CURRENT PREGNANCY IN SECOND TRIMESTER WITH HISTORY OF SPONTANEOUS ABORTION DURING PRIOR PREGNANCY: ICD-10-CM

## 2023-10-18 DIAGNOSIS — O35.EXX0 ENCOUNTER FOR REPEAT ULTRASOUND OF FETAL PYELECTASIS IN SINGLETON PREGNANCY, ANTEPARTUM: Primary | ICD-10-CM

## 2023-10-18 LAB
GLUCOSE 1H P 50 G GLC PO SERPL-MCNC: 103 MG/DL (ref 70–129)
HGB BLD-MCNC: 11.9 G/DL (ref 11.7–15.7)
T PALLIDUM AB SER QL: NONREACTIVE

## 2023-10-18 PROCEDURE — 36415 COLL VENOUS BLD VENIPUNCTURE: CPT | Performed by: OBSTETRICS & GYNECOLOGY

## 2023-10-18 PROCEDURE — 86780 TREPONEMA PALLIDUM: CPT | Performed by: OBSTETRICS & GYNECOLOGY

## 2023-10-18 PROCEDURE — 82950 GLUCOSE TEST: CPT | Performed by: OBSTETRICS & GYNECOLOGY

## 2023-10-18 PROCEDURE — 99207 PR PRENATAL VISIT: CPT | Performed by: OBSTETRICS & GYNECOLOGY

## 2023-10-18 NOTE — PROGRESS NOTES
25w0d    Doing well without issues/concerns.    US showed pyelectasis.  Recommend to repeat ultrasound at 28 to 30 weeks.  Her son had pyelectasis also, but the concern was regarding the varix.  She will think about the NIPT further, and check what her co-pay may be with the testing.  This testing may be performed anytime up until the delivery.   Glucola given. Labs today.  RTC 3 weeks   Bora Jeter MD

## 2023-10-29 ENCOUNTER — HEALTH MAINTENANCE LETTER (OUTPATIENT)
Age: 28
End: 2023-10-29

## 2023-11-08 ENCOUNTER — PRENATAL OFFICE VISIT (OUTPATIENT)
Dept: OBGYN | Facility: CLINIC | Age: 28
End: 2023-11-08
Payer: COMMERCIAL

## 2023-11-08 VITALS
HEART RATE: 103 BPM | DIASTOLIC BLOOD PRESSURE: 79 MMHG | OXYGEN SATURATION: 95 % | BODY MASS INDEX: 23.63 KG/M2 | SYSTOLIC BLOOD PRESSURE: 120 MMHG | WEIGHT: 141.8 LBS

## 2023-11-08 DIAGNOSIS — Z23 NEED FOR TDAP VACCINATION: Primary | ICD-10-CM

## 2023-11-08 DIAGNOSIS — O09.292 CURRENT PREGNANCY IN SECOND TRIMESTER WITH HISTORY OF SPONTANEOUS ABORTION DURING PRIOR PREGNANCY: ICD-10-CM

## 2023-11-08 PROCEDURE — 90715 TDAP VACCINE 7 YRS/> IM: CPT | Performed by: OBSTETRICS & GYNECOLOGY

## 2023-11-08 PROCEDURE — 99207 PR PRENATAL VISIT: CPT | Performed by: OBSTETRICS & GYNECOLOGY

## 2023-11-08 PROCEDURE — 90471 IMMUNIZATION ADMIN: CPT | Performed by: OBSTETRICS & GYNECOLOGY

## 2023-11-08 NOTE — PROGRESS NOTES
Current Status    Updated on: 11/8/2023 10:27 AM    Name Date Status Dose VIS Date Route Site  Lot# Given By Verified By   TDAP (Adacel,Boostrix) 11/8/2023 Given 0.5 mL 08/06/2021, Given Today IM RD [employer] [id number] June Jordan CMA --   Exp. Date NDC # Product Time Location External Inventory Class Comment   7/25/2025 61245-251-69 Adacel 10:27 AM -- -- -- --   Updated by: June Jordan CMA

## 2023-11-08 NOTE — NURSING NOTE
Prior to immunization administration, verified patients identity using patient s name and date of birth. Please see Immunization Activity for additional information.     Screening Questionnaire for Adult Immunization    Are you sick today?   No   Do you have allergies to medications, food, a vaccine component or latex?   No   Have you ever had a serious reaction after receiving a vaccination?   No   Do you have a long-term health problem with heart, lung, kidney, or metabolic disease (e.g., diabetes), asthma, a blood disorder, no spleen, complement component deficiency, a cochlear implant, or a spinal fluid leak?  Are you on long-term aspirin therapy?   No   Do you have cancer, leukemia, HIV/AIDS, or any other immune system problem?   No   Do you have a parent, brother, or sister with an immune system problem?   No   In the past 3 months, have you taken medications that affect  your immune system, such as prednisone, other steroids, or anticancer drugs; drugs for the treatment of rheumatoid arthritis, Crohn s disease, or psoriasis; or have you had radiation treatments?   No   Have you had a seizure, or a brain or other nervous system problem?   No   During the past year, have you received a transfusion of blood or blood    products, or been given immune (gamma) globulin or antiviral drug?   No   For women: Are you pregnant or is there a chance you could become       pregnant during the next month?   Yes   Have you received any vaccinations in the past 4 weeks?   No     Immunization questionnaire was positive for at least one answer.  Notified ceb.      Patient instructed to remain in clinic for 15 minutes afterwards, and to report any adverse reactions.     Screening performed by June Jordan CMA on 11/8/2023 at 10:28 AM.

## 2023-11-17 ENCOUNTER — HOSPITAL ENCOUNTER (OUTPATIENT)
Facility: CLINIC | Age: 28
Discharge: HOME | End: 2023-11-17
Attending: OBSTETRICS & GYNECOLOGY | Admitting: OBSTETRICS & GYNECOLOGY
Payer: COMMERCIAL

## 2023-11-17 ENCOUNTER — HOSPITAL ENCOUNTER (OUTPATIENT)
Facility: CLINIC | Age: 28
End: 2023-11-17
Admitting: OBSTETRICS & GYNECOLOGY
Payer: COMMERCIAL

## 2023-11-17 ENCOUNTER — NURSE TRIAGE (OUTPATIENT)
Dept: NURSING | Facility: CLINIC | Age: 28
End: 2023-11-17
Payer: COMMERCIAL

## 2023-11-17 VITALS — TEMPERATURE: 98.6 F | SYSTOLIC BLOOD PRESSURE: 104 MMHG | DIASTOLIC BLOOD PRESSURE: 67 MMHG | RESPIRATION RATE: 18 BRPM

## 2023-11-17 PROBLEM — Z36.89 ENCOUNTER FOR TRIAGE IN PREGNANT PATIENT: Status: ACTIVE | Noted: 2023-11-17

## 2023-11-17 LAB
ALBUMIN UR-MCNC: 10 MG/DL
APPEARANCE UR: CLEAR
BILIRUB UR QL STRIP: NEGATIVE
CLUE CELLS: NORMAL
COLOR UR AUTO: ABNORMAL
GLUCOSE UR STRIP-MCNC: NEGATIVE MG/DL
HGB UR QL STRIP: NEGATIVE
KETONES UR STRIP-MCNC: NEGATIVE MG/DL
LEUKOCYTE ESTERASE UR QL STRIP: NEGATIVE
MUCOUS THREADS #/AREA URNS LPF: PRESENT /LPF
NITRATE UR QL: NEGATIVE
PH UR STRIP: 6 [PH] (ref 5–7)
RBC URINE: 2 /HPF
SP GR UR STRIP: 1.02 (ref 1–1.03)
SQUAMOUS EPITHELIAL: 1 /HPF
TRICHOMONAS, WET PREP: NORMAL
UROBILINOGEN UR STRIP-MCNC: NORMAL MG/DL
WBC URINE: 3 /HPF
WBC'S/HIGH POWER FIELD, WET PREP: NORMAL
YEAST, WET PREP: NORMAL

## 2023-11-17 PROCEDURE — 87210 SMEAR WET MOUNT SALINE/INK: CPT

## 2023-11-17 PROCEDURE — G0463 HOSPITAL OUTPT CLINIC VISIT: HCPCS | Mod: 25

## 2023-11-17 PROCEDURE — 59025 FETAL NON-STRESS TEST: CPT

## 2023-11-17 PROCEDURE — 87491 CHLMYD TRACH DNA AMP PROBE: CPT

## 2023-11-17 PROCEDURE — 87086 URINE CULTURE/COLONY COUNT: CPT

## 2023-11-17 PROCEDURE — 81001 URINALYSIS AUTO W/SCOPE: CPT

## 2023-11-17 ASSESSMENT — ACTIVITIES OF DAILY LIVING (ADL): ADLS_ACUITY_SCORE: 20

## 2023-11-18 LAB
C TRACH DNA SPEC QL PROBE+SIG AMP: NEGATIVE
N GONORRHOEA DNA SPEC QL NAA+PROBE: NEGATIVE

## 2023-11-18 NOTE — PROGRESS NOTES
[facility]  OB Triage Assessment Note       Shirley Menchaca MRN# [id number]   Age: 27 year old YOB: 1995     HPI:  Shirley Menchaca is a 27 year old  at 29w2d by 9w0d who presents for decreased fetal movement and back/suprapubic pain.  She reports that she last felt good fetal movement at 0900. Since that time, she had felt maybe 4 small movements though less that typical. However, she presenting to triage she has now felt great fetal movement. She denies contractions though is having occasional lower abdominal cramping and pain to palpation at the fundus though no pain at rest. She will additionally have mild [place] tightening with occasional radiation to her back. She denies vaginal bleeding and loss of fluids. No recent abdominal trauma. She received her primary prenatal care at [facility] though they are currently on divert.     Pregnancy Complications:  - Fetal renal pyelectasis   - JUAN DIEGO  - Breech presentation   - H/o PTD (36w6d); umbilical vein varix   - H/o 4th degree laceration   - COVID in first trimester    Prenatal Labs:   Lab Results   Component Value Date    ABO O 2020    RH Pos 2020    AS Negative 2023    HEPBANG Nonreactive 2023    CHPCRT Negative 2018    GCPCRT Negative 2018    HGB 11.9 10/18/2023       OB History  OB History    Para Term  AB Living   3 1 0 1 1 1   SAB IAB Ectopic Multiple Live Births   1 0 0 0 1      # Outcome Date GA Lbr Ruperto/2nd Weight Sex Delivery Anes PTL Lv   3 Current            2  21 36w6d 04:37 / 03:13 3.76 kg (8 lb 4.6 oz) M IVD EPI N LITZY      Birth Comments: 4th degree laceration      Complications: Umbilical cord complication      Name: STEPHY MENCHACA      Apgar1: 8  Apgar5: 9   1 SAB                PMHx:  Past Medical History:   Diagnosis Date    Alopecia     Anxiety     Depression with anxiety     Exercise-induced asthma     Exertional asthma      as a teenager    Family history of malignant neoplasm of breast     mother, recurrent with mets, stage IV    Migraine with aura and without status migrainosus, not intractable        PSHx:   Past Surgical History:   Procedure Laterality Date    NO HISTORY OF SURGERY         Meds:   No medications prior to admission.       Allergies:  No Known Allergies     FmHx:   Family History   Problem Relation Age of Onset    Breast Cancer Mother     Alcoholism Mother     Colon Cancer Father 52    Alcoholism Father     Anxiety Disorder Sister     Anxiety Disorder Sister     Anxiety Disorder Brother     Alcoholism Brother     Thyroid Disease Maternal Grandmother     Thyroid Disease Paternal Grandmother     Suicide Cousin     Schizophrenia Cousin      ROS:   Negative except as noted in HPI.     PE:  Vit: Patient Vitals for the past 4 hrs:   BP Temp Temp src Resp   23 104/67 98.6  F (37  C) Oral 18        Gen: Well-appearing, NAD, comfortable   CV: Regular rate and well perfused  Pulm: Breathing comfortably on RA  Abd: Soft, gravid, non-tender  :  Normal external female genitalia, vaginal mucosa pink/well rugaeted. Cervix normal in appearance without lesions. Moderate thin grey discharge. No pooling/leaking, no blood in vaginal vault. External os visually tightly closed.      BSUS: Breech presentation. Fetal heart rate reassuring on monitor. At least one fetal movement with extension of the arm visualized. Anterior placenta.      NST  FHT: Baseline 140, moderate variability, + accelerations, no decelerations   [place]: 0 contractions in 10 minutes      Assessment  Ms. Shirley Matthew is a 27 year old , at 29w2d by 9w0d who presents for decreased fetal movement and back/suprapubic pain.    Plan    # Decreased fetal movement   NST reactive and reassuring. Fetal movement returned at presentation to triage. BSUS performed for patient reassurance, visually heart tones reassuring and at least one full limb movement  appreciated. Anterior placenta- discussed may blunt sensation of movement.   - Discussed kick counts - having small snack/fluids and laying on side and palpating movements for 1 hour. We expect 5 movements in 1 hour. If <5 movements, can repeat for an additional hour. If again <5 movements palpated call triage. Otherwise, return precautions including contractions, loss of fluid, vaginal bleeding  - Patient reports significant anxiety surrounding fetal movement- Hydroxyzine offered, patient declines at this time given that is has previously made her very somnolent     # Back Pain   # Abdominal pain  Clinical presentation sounding similar to MSK pains of pregnancy. However, SSE performed to rule out PTL. Cervix visually closed, no evidence of PPROM. Wet prep negative, UA without evidence of infection.   - Discussed conservative management options including heating/cooling packs, stretching, and Tylenol    Discharge to home in stable condition    Discussed with Dr Melo Whipple MD  Ob/Gyn PGY-2  11/17/23 10:41 PM

## 2023-11-18 NOTE — TELEPHONE ENCOUNTER
"OB Triage Call      Is patient's OB/Midwife with the [facility] or [facility]? [facility]- Proceed with triage     Reason for call: Decreased fetal movement     Assessment: Pt reports she is 29w2d pregnant. RANJAN 23. Pt reports decreased fetal movement today \"all day, since 9 am\". Pt reports she has been less distracted over last two hours and felt \"maybe five\" movements. Pt reports she also feels very anxious about decreased fetal movement and is having back pain.    Plan: L&D     Patient plans to deliver at [place]    Patient's primary OB Provider is Dr. Jeter.      Per protocol recommendations Patient to be evaluated in L&D. Patient's primary OB is [facility].  Labor and delivery at [facility] ([phone]) notified of patient's pending arrival.  Report given to Trish EVERETT.      Is patient's delivering hospital on divert? Yes- If patient's hospital of choice is on divert, explain to patient their current hospital of choice is not accepting patients. Review other [facility] locations with patient. Patient's second hospital of choice Delivering Hospital: [facility] ([phone]).  L&D notified of patient's pending arrival. Report given to Trish EVERETT.  Route encounter to primary OB provider as FYI. OB/NICU capacity document updated.       29w2d    Estimated Date of Delivery: 2024        OB History    Para Term  AB Living   3 1 0 1 1 1   SAB IAB Ectopic Multiple Live Births   1 0 0 0 1      # Outcome Date GA Lbr Ruperto/2nd Weight Sex Delivery Anes PTL Lv   3 Current            2  21 36w6d 04:37 / 03:13 3.76 kg (8 lb 4.6 oz) M IVD EPI N LITZY      Birth Comments: 4th degree laceration      Complications: Umbilical cord complication      Name: NAKIA MENCHACAPHU      Apgar1: 8  Apgar5: 9   1 SAB                Lab Results   Component Value Date    GBS Negative 2021          Claire Isabel RN 23 6:42 PM  [facility] Nurse Advisor      Reason for Disposition   [1] Pregnant 23 or more weeks AND [2] mother thinks baby is moving less (e.g., even if kick count is normal or not performed)  (Exception: Mother was distracted by other activities.)    Additional Information   Negative: Sounds like a life-threatening emergency to the triager   Negative: [1] Pregnant 20 or more weeks AND [2] abdominal pain   Negative: [1] Pregnant 20 or more weeks AND [2] vaginal bleeding or spotting   Negative: [1] Pregnant 37 or more weeks (term) AND [2] having contractions or other symptoms of labor   Negative: [1] Pregnant < 37 weeks AND [2] having contractions or other symptoms of labor   Negative: Injury to abdomen   Negative: [1] SEVERE headache AND [2] not relieved with acetaminophen (e.g., Tylenol)   Negative: New blurred vision or vision changes   Negative: Leakage of fluid from vagina    Protocols used: Pregnancy - Decreased or Abnormal Fetal Movement-A-AH

## 2023-11-18 NOTE — PROGRESS NOTES
Data: [facility] at .   Reason for maternal/fetal assessment per patient is Decreased Fetal Movement  . Patient is a . Prenatal record reviewed.      OB History    Para Term  AB Living   3 1 0 1 1 1   SAB IAB Ectopic Multiple Live Births   1 0 0 0 1      # Outcome Date GA Lbr Ruperto/2nd Weight Sex Delivery Anes PTL Lv   3 Current            2  16/ 36w6d 04:37 / 03:13 3.76 kg (8 lb 4.6 oz) M IVD EPI N LITZY      Birth Comments: 4th degree laceration      Complications: Umbilical cord complication      Name: STEPHY MENCHACA      Apgar1: 8  Apgar5: 9   1 SAB               Medical History:   Past Medical History:   Diagnosis Date    Alopecia     Anxiety     Depression with anxiety     Exercise-induced asthma     Exertional asthma     as a teenager    Family history of malignant neoplasm of breast     mother, recurrent with mets, stage IV    Migraine with aura and without status migrainosus, not intractable    . Gestational Age 29w2d. VSS. Cervix: closed.  Fetal movement present. Patient denies vaginal discharge, pelvic pressure, UTI symptoms, GI problems, bloody show, vaginal bleeding, edema, headache, visual disturbances, epigastric or URQ pain, abdominal pain, rupture of membranes. Support persons Yolis-sister present.  Action: Verbal consent for EFM. Triage assessment completed. EFM applied for 1 hour. Uterine assessment completed. Fetal assessment: Presumed adequate fetal oxygenation documented (see flow record). Patient education pamphlets given on fetal movement counts and when to return if other concerns arise. Patient instructed to report change in fetal movement, vaginal leaking of fluid or bleeding, abdominal pain, or any concerns related to the pregnancy to her nurse/physician.   Response: Dr. Whipple/Dr. Alejo informed of arrival. Plan per provider is discharge and will send out my chart results and prescriptions if anything is abnormal. Patient  verbalized understanding of education and verbalized agreement with plan. Discharged ambulatory at 2129.

## 2023-11-19 LAB — BACTERIA UR CULT: NORMAL

## 2023-11-21 ENCOUNTER — PRENATAL OFFICE VISIT (OUTPATIENT)
Dept: OBGYN | Facility: CLINIC | Age: 28
End: 2023-11-21
Payer: COMMERCIAL

## 2023-11-21 ENCOUNTER — ANCILLARY PROCEDURE (OUTPATIENT)
Dept: ULTRASOUND IMAGING | Facility: CLINIC | Age: 28
End: 2023-11-21
Attending: OBSTETRICS & GYNECOLOGY
Payer: COMMERCIAL

## 2023-11-21 VITALS
SYSTOLIC BLOOD PRESSURE: 119 MMHG | HEART RATE: 100 BPM | OXYGEN SATURATION: 97 % | WEIGHT: 142.4 LBS | BODY MASS INDEX: 23.73 KG/M2 | DIASTOLIC BLOOD PRESSURE: 84 MMHG

## 2023-11-21 DIAGNOSIS — O09.293 CURRENT PREGNANCY IN THIRD TRIMESTER WITH HISTORY OF SPONTANEOUS ABORTION DURING PRIOR PREGNANCY: Primary | ICD-10-CM

## 2023-11-21 DIAGNOSIS — O35.EXX0 ENCOUNTER FOR REPEAT ULTRASOUND OF FETAL PYELECTASIS IN SINGLETON PREGNANCY, ANTEPARTUM: ICD-10-CM

## 2023-11-21 PROCEDURE — 76816 OB US FOLLOW-UP PER FETUS: CPT | Mod: TC | Performed by: RADIOLOGY

## 2023-11-21 PROCEDURE — 99207 PR PRENATAL VISIT: CPT | Performed by: OBSTETRICS & GYNECOLOGY

## 2023-11-21 NOTE — PROGRESS NOTES
29w6d   /84 (BP Location: Right arm, Cuff Size: Adult Regular)   Pulse 100   Wt 64.6 kg (142 lb 6.4 oz)   LMP 04/26/2023   SpO2 97%   BMI 23.73 kg/m    Tired.  No HA, visual changes, N/V  Good FM, No ROM, No vaginal bleeding   Ultrasound results pending.  We reviewed the tech's flow sheet in clinic today.   RTC 2 wk  Bora Jeter MD

## 2023-12-06 ENCOUNTER — PRENATAL OFFICE VISIT (OUTPATIENT)
Dept: OBGYN | Facility: CLINIC | Age: 28
End: 2023-12-06
Payer: COMMERCIAL

## 2023-12-06 VITALS
HEART RATE: 110 BPM | DIASTOLIC BLOOD PRESSURE: 69 MMHG | SYSTOLIC BLOOD PRESSURE: 107 MMHG | OXYGEN SATURATION: 98 % | BODY MASS INDEX: 24.02 KG/M2 | WEIGHT: 144.2 LBS

## 2023-12-06 DIAGNOSIS — U07.1 COVID-19 AFFECTING PREGNANCY IN THIRD TRIMESTER: ICD-10-CM

## 2023-12-06 DIAGNOSIS — O98.513 COVID-19 AFFECTING PREGNANCY IN THIRD TRIMESTER: ICD-10-CM

## 2023-12-06 DIAGNOSIS — O09.293 CURRENT PREGNANCY IN THIRD TRIMESTER WITH HISTORY OF SPONTANEOUS ABORTION DURING PRIOR PREGNANCY: Primary | ICD-10-CM

## 2023-12-06 DIAGNOSIS — Z29.11 NEED FOR RSV IMMUNIZATION: ICD-10-CM

## 2023-12-06 PROCEDURE — 96372 THER/PROPH/DIAG INJ SC/IM: CPT | Performed by: OBSTETRICS & GYNECOLOGY

## 2023-12-06 PROCEDURE — 90678 RSV VACC PREF BIVALENT IM: CPT | Performed by: OBSTETRICS & GYNECOLOGY

## 2023-12-06 PROCEDURE — 99207 PR PRENATAL VISIT: CPT | Performed by: OBSTETRICS & GYNECOLOGY

## 2023-12-06 NOTE — PROGRESS NOTES
32w0d.    /69 (BP Location: Right arm, Cuff Size: Adult Regular)   Pulse 110   Wt 65.4 kg (144 lb 3.2 oz)   LMP 04/26/2023   SpO2 98%   BMI 24.02 kg/m    Tired.  No HA, visual changes, N/V   We reviewed the RSV vaccine and the goals and limitations and risks and benefits.  RSV vaccine today  RTC 2 wk

## 2023-12-20 ENCOUNTER — PRENATAL OFFICE VISIT (OUTPATIENT)
Dept: OBGYN | Facility: CLINIC | Age: 28
End: 2023-12-20
Payer: COMMERCIAL

## 2023-12-20 ENCOUNTER — ANCILLARY PROCEDURE (OUTPATIENT)
Dept: ULTRASOUND IMAGING | Facility: CLINIC | Age: 28
End: 2023-12-20
Attending: OBSTETRICS & GYNECOLOGY
Payer: COMMERCIAL

## 2023-12-20 VITALS
HEART RATE: 82 BPM | DIASTOLIC BLOOD PRESSURE: 75 MMHG | SYSTOLIC BLOOD PRESSURE: 112 MMHG | OXYGEN SATURATION: 96 % | BODY MASS INDEX: 24.72 KG/M2 | WEIGHT: 148.4 LBS

## 2023-12-20 DIAGNOSIS — U07.1 COVID-19 AFFECTING PREGNANCY IN THIRD TRIMESTER: ICD-10-CM

## 2023-12-20 DIAGNOSIS — O09.293 CURRENT PREGNANCY IN THIRD TRIMESTER WITH HISTORY OF SPONTANEOUS ABORTION DURING PRIOR PREGNANCY: ICD-10-CM

## 2023-12-20 DIAGNOSIS — O09.293 CURRENT PREGNANCY IN THIRD TRIMESTER WITH HISTORY OF SPONTANEOUS ABORTION DURING PRIOR PREGNANCY: Primary | ICD-10-CM

## 2023-12-20 DIAGNOSIS — O98.513 COVID-19 AFFECTING PREGNANCY IN THIRD TRIMESTER: ICD-10-CM

## 2023-12-20 PROCEDURE — 76816 OB US FOLLOW-UP PER FETUS: CPT | Mod: TC | Performed by: STUDENT IN AN ORGANIZED HEALTH CARE EDUCATION/TRAINING PROGRAM

## 2023-12-20 PROCEDURE — 99207 PR PRENATAL VISIT: CPT | Performed by: OBSTETRICS & GYNECOLOGY

## 2023-12-20 NOTE — PROGRESS NOTES
34w0d    /75 (BP Location: Right arm, Cuff Size: Adult Regular)   Pulse 82   Wt 67.3 kg (148 lb 6.4 oz)   LMP 04/26/2023   SpO2 96%   BMI 24.72 kg/m    Tired.  No HA, visual changes, N/V  Ultrasound films reviewed.  Good growth is apparent.  Final report is pending.   She will get the COVID vaccine.  She will set up pharmacy appointment.   RTC 2 wk  Bora Jeter MD

## 2024-01-03 ENCOUNTER — PRENATAL OFFICE VISIT (OUTPATIENT)
Dept: OBGYN | Facility: CLINIC | Age: 29
End: 2024-01-03
Payer: COMMERCIAL

## 2024-01-03 VITALS
WEIGHT: 150.4 LBS | HEART RATE: 88 BPM | BODY MASS INDEX: 25.06 KG/M2 | DIASTOLIC BLOOD PRESSURE: 70 MMHG | SYSTOLIC BLOOD PRESSURE: 120 MMHG | OXYGEN SATURATION: 95 %

## 2024-01-03 DIAGNOSIS — Z36.85 ANTENATAL SCREENING FOR STREPTOCOCCUS B: ICD-10-CM

## 2024-01-03 DIAGNOSIS — O09.293 CURRENT PREGNANCY IN THIRD TRIMESTER WITH HISTORY OF SPONTANEOUS ABORTION DURING PRIOR PREGNANCY: Primary | ICD-10-CM

## 2024-01-03 PROCEDURE — 99207 PR PRENATAL VISIT: CPT | Performed by: OBSTETRICS & GYNECOLOGY

## 2024-01-03 PROCEDURE — 87653 STREP B DNA AMP PROBE: CPT | Performed by: OBSTETRICS & GYNECOLOGY

## 2024-01-03 NOTE — PROGRESS NOTES
36w0d    /70 (BP Location: Right arm, Cuff Size: Adult Regular)   Pulse 88   Wt 68.2 kg (150 lb 6.4 oz)   LMP 04/26/2023   SpO2 95%   BMI 25.06 kg/m    Cervix:  2.5 cm/50%/-2  No HA, visual changes, N/V  Good FM, No ROM, No vaginal bleeding   Labor plan and warning s/s discussed.   GBS testing today.  RTC 1 week.  Bora Jeter MD

## 2024-01-04 LAB — GP B STREP DNA SPEC QL NAA+PROBE: POSITIVE

## 2024-01-09 ENCOUNTER — PRENATAL OFFICE VISIT (OUTPATIENT)
Dept: OBGYN | Facility: CLINIC | Age: 29
End: 2024-01-09
Payer: COMMERCIAL

## 2024-01-09 VITALS — SYSTOLIC BLOOD PRESSURE: 113 MMHG | WEIGHT: 152.2 LBS | DIASTOLIC BLOOD PRESSURE: 80 MMHG | BODY MASS INDEX: 25.36 KG/M2

## 2024-01-09 DIAGNOSIS — Z36.85 ANTENATAL SCREENING FOR STREPTOCOCCUS B: Primary | ICD-10-CM

## 2024-01-09 PROCEDURE — 99207 PR PRENATAL VISIT: CPT | Performed by: OBSTETRICS & GYNECOLOGY

## 2024-01-09 NOTE — PATIENT INSTRUCTIONS
To Schedule an Appointment 24/7  Call: [phone]    If you have any questions regarding your visit, Please contact your care team.  [facility]: [phone]  [facility] CLINIC HOURS TELEPHONE NUMBER   Cephas Agbeh, M.D.      Aman Abraham-Surgery Scheduler  Laurie-Surgery Scheduler       Monday - [place]:    8:00 am-4:45 pm  Tuesday - [place]:   8:00 am-4:45 pm  Friday-[place]:       8:00 am-4:45 pm  Typical Surgery Day:  Wednesday [facility]   [address] [place] [address]   [phone]   Fax [phone]    Imaging Scheduling all locations  [phone]      [facility] Labor and Delivery   [address] [place] [address] [place] [address]   [phone]    [facility]  [address] [facility] [address]  [phone]  Fax [phone]   Urgent Care locations:  [facility] Monday-Friday                               10 am - 8 pm  Saturday and Sunday                      9 am - 5 pm  Monday-Friday                              10 am- 8 pm  Saturday and Sunday                      9 am - 5 pm    [phone]   **Surgeries** Our Surgery Schedulers will contact you to schedule. If you do not receive a call within 3 business days, please call [phone].  If you need a medication refill, please contact your pharmacy. Please allow 3 business days for your refill to be completed.  As always, Thank you for trusting us with your healthcare needs!  see additional instructions from your care team below   Week 37 of Your Pregnancy: Care Instructions    Most babies are born between 37 and 40 weeks.   This is a good time to pack a bag to take with you to the birth. Then it will be ready to go when you are.     Learn about breastfeeding.  For example, find out about ways to hold your baby to make breastfeeding easier. And think about learning how to pump and store  "milk.     Know that crying is normal.  It's common for babies to cry 1 to 3 hours a day. Some cry more, and some cry less.     Learn why babies cry.  They may be hungry; have gas; need a diaper change; or feel cold, warm, tired, lonely, or tense. Sometimes they cry for unknown reasons.     Think about what will help you stay calm when your baby cries.  Taking slow, deep breaths can help. So can taking a break. It's okay to put your baby somewhere safe (like their crib) and walk away for a few minutes.     Learn about safe sleep for your baby.  Always put your baby to sleep on their back. Place them alone in a crib or bassinet with a firm, flat surface. Keep soft items like stuffed animals out of the crib.     Learn what to expect with  poop.  Your baby will have their own bowel patterns. Some babies have several bowel movements a day. Some have fewer.     Know that  babies will often have loose, yellow bowel movements.  Formula-fed babies have more formed stools. If your baby's poop looks like pellets, your baby is constipated.   Follow-up care is a key part of your treatment and safety. Be sure to make and go to all appointments, and call your doctor if you are having problems. It's also a good idea to know your test results and keep a list of the medicines you take.  Where can you learn more?  Go to https://www.Site[employer].net/patiented  Enter N257 in the search box to learn more about \"Week 37 of Your Pregnancy: Care Instructions.\"  Current as of: 2023               Content Version: 13.8    8908-7044 [employer].   Care instructions adapted under license by your healthcare professional. If you have questions about a medical condition or this instruction, always ask your healthcare professional. [employer] disclaims any warranty or liability for your use of this information.      "

## 2024-01-09 NOTE — PROGRESS NOTES
"36w6d  No HA, visual changes, N/V etc. Cervix remains unchanged. Labor plan and warning s/s discussed. Routine AG. See flowsheet. RTC 1 wk/prn.  GBS positive..  Vital signs:      BP: 113/80               Weight: 69 kg (152 lb 3.2 oz)  Estimated body mass index is 25.36 kg/m  as calculated from the following:    Height as of 21: 1.65 m (5' 4.96\").    Weight as of this encounter: 69 kg (152 lb 3.2 oz).         ICD-10-CM    1.  screening for streptococcus B  Z36.85         CEPHAS AGBEH, MD.    "

## 2024-01-13 ENCOUNTER — NURSE TRIAGE (OUTPATIENT)
Dept: NURSING | Facility: CLINIC | Age: 29
End: 2024-01-13
Payer: COMMERCIAL

## 2024-01-13 NOTE — TELEPHONE ENCOUNTER
OB Triage Call      Is patient's OB/Midwife with [facility] or [facility]? [facility]- Proceed with triage     Reason for call: The patient is complaining of back cramps during the night of 24  Today with tightness and pain in the abdominal and back area  She reports symptoms slowed down after awakening    Assessment: She denies any pain currently, no leakage of fluid, no bloody show, and reports the fetus is kicking and moving at least 5 times within an hour to 10 times within two hours    Plan: Continue to monitor at home    Patient plans to deliver at [place]    Patient's primary OB Provider is Dr. Jeter.      Per protocol recommendations Patient to follow home care recommendations.       Is patient's delivering hospital on divert? No      37w3d    Estimated Date of Delivery: 2024        OB History    Para Term  AB Living   3 1 0 1 1 1   SAB IAB Ectopic Multiple Live Births   1 0 0 0 1      # Outcome Date GA Lbr Ruperto/2nd Weight Sex Delivery Anes PTL Lv   3 Current            2  21 36w6d 04:37 / 03:13 3.76 kg (8 lb 4.6 oz) M IVD EPI N LITZY      Birth Comments: 4th degree laceration      Complications: Umbilical cord complication      Name: STEPHY MENCHACA      Apgar1: 8  Apgar5: 9   1 SAB                Lab Results   Component Value Date    GBS Negative 2021          Sandi Agudelo RN 24 2:00 PM  [facility] Nurse Advisor  Reason for Disposition   [1] History of prior delivery (multipara) AND [2] contractions > 10 minutes OR for < 1 hour    Additional Information   Negative: Passed out (i.e., lost consciousness, collapsed and was not responding)   Negative: Shock suspected (e.g., cold/pale/clammy skin, too weak to stand, low BP, rapid pulse)   Negative: Difficult to awaken or acting confused (e.g., disoriented, slurred speech)   Negative: [1] SEVERE abdominal pain (e.g., excruciating) AND [2] constant AND [3] present > 1 hour   Negative:  "SEVERE bleeding (e.g., continuous red blood from vagina, or large blood clots)   Negative: Umbilical cord hanging out of the vagina (shiny, white, curled appearance, \"like telephone cord\")   Negative: Uncontrollable urge to push (i.e., feels like baby is coming out now)   Negative: Can see baby   Negative: Sounds like a life-threatening emergency to the triager   Negative: Pregnant < 37 weeks (i.e., )   Negative: [1] Uncertain delivery date AND [2] possibly pregnant < 37 weeks (i.e., )   Negative: [1] First baby (primipara) AND [2] contractions < 6 minutes apart  AND [3] present 2 hours   Negative: [1] History of prior delivery (multipara) AND [2] contractions < 10 minutes apart AND [3] present 1 hour   Negative: [1] History of rapid prior delivery AND [2] contractions < 10 minutes apart   Negative: [1] Leakage of fluid from vagina AND [2] green or brown in color   Negative: Leakage of fluid from vagina   Negative: Vaginal bleeding or spotting  (Exception: Brief spotting after intercourse or pelvic exam.)   Negative: Baby moving less today (e.g., kick count < 5 in 1 hour or < 10 in 2 hours)   Negative: Severe headache or headache that won't go away   Negative: New blurred vision or vision changes   Negative: MODERATE-SEVERE abdominal pain   Negative: [1] MILD abdominal pain (e.g., doesn't interfere with normal activities) AND [2] constant AND [3] present > 2 hours   Negative: Fever 100.4 F (38.0 C) or higher   Negative: Patient sounds very sick or weak to the triager   Negative: [1] First baby (primipara) AND [2] contractions < 10 minutes apart AND [3] present 4 hours or longer   Negative: [1] First baby (primipara) AND [2] contractions > 5 minutes apart OR for < 2 hours    Protocols used: Pregnancy - Labor-A-AH    "

## 2024-01-17 ENCOUNTER — PRENATAL OFFICE VISIT (OUTPATIENT)
Dept: OBGYN | Facility: CLINIC | Age: 29
End: 2024-01-17
Payer: COMMERCIAL

## 2024-01-17 VITALS
OXYGEN SATURATION: 96 % | DIASTOLIC BLOOD PRESSURE: 84 MMHG | BODY MASS INDEX: 25.69 KG/M2 | SYSTOLIC BLOOD PRESSURE: 118 MMHG | HEART RATE: 96 BPM | WEIGHT: 154.2 LBS

## 2024-01-17 DIAGNOSIS — O98.513 COVID-19 AFFECTING PREGNANCY IN THIRD TRIMESTER: ICD-10-CM

## 2024-01-17 DIAGNOSIS — U07.1 COVID-19 AFFECTING PREGNANCY IN THIRD TRIMESTER: ICD-10-CM

## 2024-01-17 DIAGNOSIS — O09.293 CURRENT PREGNANCY IN THIRD TRIMESTER WITH HISTORY OF SPONTANEOUS ABORTION DURING PRIOR PREGNANCY: ICD-10-CM

## 2024-01-17 DIAGNOSIS — F41.9 ANXIETY: Primary | ICD-10-CM

## 2024-01-17 PROCEDURE — 99207 PR PRENATAL VISIT: CPT | Performed by: OBSTETRICS & GYNECOLOGY

## 2024-01-17 RX ORDER — HYDROXYZINE PAMOATE 25 MG/1
25-50 CAPSULE ORAL 3 TIMES DAILY PRN
Qty: 20 CAPSULE | Refills: 1 | Status: SHIPPED | OUTPATIENT
Start: 2024-01-17

## 2024-01-18 NOTE — PROGRESS NOTES
38w0d    /84 (BP Location: Right arm, Cuff Size: Adult Regular)   Pulse 96   Wt 69.9 kg (154 lb 3.2 oz)   LMP 04/26/2023   SpO2 96%   BMI 25.69 kg/m    Cervix:  4 cm / 60% / -1  No HA, visual changes, N/V  Labor plan and warning s/s discussed.   GBS positive, antibiotics will be used in labor.  Induction discussed.  She will think about it.   She has had some anxiety and she is wondering about Vistaril.  She thinks the 25 mg will be ok for her to try.  She has used 100 mg but she feels she was too fatigued with that dosing.  Prescription is written.   RTC 1 wk  Bora Jeter MD

## 2024-01-26 ENCOUNTER — PRENATAL OFFICE VISIT (OUTPATIENT)
Dept: OBGYN | Facility: CLINIC | Age: 29
End: 2024-01-26
Payer: COMMERCIAL

## 2024-01-26 VITALS
HEART RATE: 88 BPM | WEIGHT: 157.2 LBS | DIASTOLIC BLOOD PRESSURE: 82 MMHG | OXYGEN SATURATION: 98 % | BODY MASS INDEX: 26.19 KG/M2 | SYSTOLIC BLOOD PRESSURE: 117 MMHG

## 2024-01-26 DIAGNOSIS — O99.820 STREPTOCOCCUS B CARRIER STATE COMPLICATING PREGNANCY: ICD-10-CM

## 2024-01-26 DIAGNOSIS — O98.513 COVID-19 AFFECTING PREGNANCY IN THIRD TRIMESTER: ICD-10-CM

## 2024-01-26 DIAGNOSIS — O09.293 CURRENT PREGNANCY IN THIRD TRIMESTER WITH HISTORY OF SPONTANEOUS ABORTION DURING PRIOR PREGNANCY: Primary | ICD-10-CM

## 2024-01-26 DIAGNOSIS — U07.1 COVID-19 AFFECTING PREGNANCY IN THIRD TRIMESTER: ICD-10-CM

## 2024-01-26 PROCEDURE — 99207 PR PRENATAL VISIT: CPT | Performed by: OBSTETRICS & GYNECOLOGY

## 2024-01-27 NOTE — PROGRESS NOTES
39w2d    /82 (BP Location: Right arm, Patient Position: Chair, Cuff Size: Adult Regular)   Pulse 88   Wt 71.3 kg (157 lb 3.2 oz)   LMP 04/26/2023   SpO2 98%   BMI 26.19 kg/m    Cervix:  4.5 cm/70%/-1  No HA, visual changes, N/V   Irregular contractions.  GBS positive.  Labor plan and warning s/s discussed.    Induction discussed.  She is interested.  The induction has been scheduled for 01/31/2024 at 0730 (this is first available for elective induction).   RTC 1 wk  Bora Jeter MD

## 2024-02-29 ENCOUNTER — MEDICAL CORRESPONDENCE (OUTPATIENT)
Dept: HEALTH INFORMATION MANAGEMENT | Facility: CLINIC | Age: 29
End: 2024-02-29
Payer: COMMERCIAL

## 2024-03-27 ENCOUNTER — PRENATAL OFFICE VISIT (OUTPATIENT)
Dept: OBGYN | Facility: CLINIC | Age: 29
End: 2024-03-27
Payer: COMMERCIAL

## 2024-03-27 VITALS
HEART RATE: 85 BPM | SYSTOLIC BLOOD PRESSURE: 112 MMHG | BODY MASS INDEX: 22.66 KG/M2 | WEIGHT: 136 LBS | OXYGEN SATURATION: 96 % | DIASTOLIC BLOOD PRESSURE: 80 MMHG

## 2024-03-27 DIAGNOSIS — Z12.4 PAP SMEAR FOR CERVICAL CANCER SCREENING: ICD-10-CM

## 2024-03-27 PROCEDURE — 99207 PR POST PARTUM EXAM: CPT | Performed by: OBSTETRICS & GYNECOLOGY

## 2024-03-27 PROCEDURE — 87624 HPV HI-RISK TYP POOLED RSLT: CPT | Performed by: OBSTETRICS & GYNECOLOGY

## 2024-03-27 PROCEDURE — G0145 SCR C/V CYTO,THINLAYER,RESCR: HCPCS | Performed by: OBSTETRICS & GYNECOLOGY

## 2024-03-27 ASSESSMENT — PATIENT HEALTH QUESTIONNAIRE - PHQ9: SUM OF ALL RESPONSES TO PHQ QUESTIONS 1-9: 3

## 2024-03-27 NOTE — PROGRESS NOTES
POSTPARTUM VISIT:    Delivery Information:    Date:  01/31/2024  Route:  vaginal delivery   Sex:  male     Weight:  4150 g      Apgars:  9/9  Reviewed pregnancy and birth.  Doing well.  No significant symptoms.  Infant doing fine.  Breast feeding:  yes   Bottle:  no  Formula:  no    Exam:  /80 (BP Location: Right arm, Cuff Size: Adult Regular)   Pulse 85   Wt 61.7 kg (136 lb)   LMP 04/26/2023   SpO2 96%   Breastfeeding Yes   BMI 22.66 kg/m    PHQ-9 = 3  General:  WNWD female, NAD  Alert  Oriented x 3  Gait:  Normal  Skin:  Normal skin turgor  HEENT:  NC/AT, EOMI  Abdomen:  Non-tender, non-distended.  Vulva: No external lesions, normal hair distribution, no adenopathy  BUS:  Normal, no masses noted  Vagina: Moist, pink, no abnormal discharge, well rugated, no lesions  Cervix: Smooth, pink, no visible lesions  Uterus: Normal size, anteverted, non-tender, mobile  Ovaries: No mass, non-tender, mobile    Reviewed contraception plans.  We reviewed the options available, the side effects, risks, benefits and instructions on proper use.  They will use condoms.  They are considering the vasectomy for contraception.  Dr. Wilson's card is given to her.    Pap smear performed today.   Continue general medical care.

## 2024-04-01 LAB
BKR LAB AP GYN ADEQUACY: NORMAL
BKR LAB AP GYN INTERPRETATION: NORMAL
BKR LAB AP HPV REFLEX: NORMAL
BKR LAB AP PREVIOUS ABNORMAL: NORMAL
PATH REPORT.COMMENTS IMP SPEC: NORMAL
PATH REPORT.COMMENTS IMP SPEC: NORMAL
PATH REPORT.RELEVANT HX SPEC: NORMAL

## 2024-04-02 LAB
HUMAN PAPILLOMA VIRUS 16 DNA: NEGATIVE
HUMAN PAPILLOMA VIRUS 18 DNA: NEGATIVE
HUMAN PAPILLOMA VIRUS FINAL DIAGNOSIS: NORMAL
HUMAN PAPILLOMA VIRUS OTHER HR: NEGATIVE

## 2024-04-04 ENCOUNTER — MEDICAL CORRESPONDENCE (OUTPATIENT)
Dept: HEALTH INFORMATION MANAGEMENT | Facility: CLINIC | Age: 29
End: 2024-04-04
Payer: COMMERCIAL

## 2024-06-06 ENCOUNTER — MEDICAL CORRESPONDENCE (OUTPATIENT)
Dept: HEALTH INFORMATION MANAGEMENT | Facility: CLINIC | Age: 29
End: 2024-06-06
Payer: COMMERCIAL

## 2024-07-06 ENCOUNTER — MYC MEDICAL ADVICE (OUTPATIENT)
Dept: OBGYN | Facility: CLINIC | Age: 29
End: 2024-07-06
Payer: COMMERCIAL

## 2024-07-08 ENCOUNTER — MYC MEDICAL ADVICE (OUTPATIENT)
Dept: FAMILY MEDICINE | Facility: CLINIC | Age: 29
End: 2024-07-08
Payer: COMMERCIAL

## 2024-12-21 ENCOUNTER — HEALTH MAINTENANCE LETTER (OUTPATIENT)
Age: 29
End: 2024-12-21